# Patient Record
Sex: FEMALE | Race: AMERICAN INDIAN OR ALASKA NATIVE | NOT HISPANIC OR LATINO | Employment: FULL TIME | URBAN - METROPOLITAN AREA
[De-identification: names, ages, dates, MRNs, and addresses within clinical notes are randomized per-mention and may not be internally consistent; named-entity substitution may affect disease eponyms.]

---

## 2017-12-19 ENCOUNTER — OFFICE VISIT - RIVER FALLS (OUTPATIENT)
Dept: FAMILY MEDICINE | Facility: CLINIC | Age: 27
End: 2017-12-19

## 2017-12-19 ASSESSMENT — MIFFLIN-ST. JEOR: SCORE: 1473.81

## 2018-02-20 ENCOUNTER — OFFICE VISIT - RIVER FALLS (OUTPATIENT)
Dept: FAMILY MEDICINE | Facility: CLINIC | Age: 28
End: 2018-02-20

## 2018-02-26 ENCOUNTER — OFFICE VISIT - RIVER FALLS (OUTPATIENT)
Dept: FAMILY MEDICINE | Facility: CLINIC | Age: 28
End: 2018-02-26

## 2018-02-26 ASSESSMENT — MIFFLIN-ST. JEOR: SCORE: 1496.49

## 2018-04-16 ENCOUNTER — OFFICE VISIT - RIVER FALLS (OUTPATIENT)
Dept: FAMILY MEDICINE | Facility: CLINIC | Age: 28
End: 2018-04-16

## 2018-04-16 ASSESSMENT — MIFFLIN-ST. JEOR: SCORE: 1481.98

## 2018-08-01 ENCOUNTER — OFFICE VISIT - RIVER FALLS (OUTPATIENT)
Dept: FAMILY MEDICINE | Facility: CLINIC | Age: 28
End: 2018-08-01

## 2018-08-08 ENCOUNTER — OFFICE VISIT - RIVER FALLS (OUTPATIENT)
Dept: FAMILY MEDICINE | Facility: CLINIC | Age: 28
End: 2018-08-08

## 2018-09-11 ENCOUNTER — OFFICE VISIT - RIVER FALLS (OUTPATIENT)
Dept: FAMILY MEDICINE | Facility: CLINIC | Age: 28
End: 2018-09-11

## 2018-09-12 LAB
CREAT SERPL-MCNC: 0.71 MG/DL (ref 0.5–1.1)
GLUCOSE BLD-MCNC: 71 MG/DL (ref 65–99)

## 2018-10-09 ENCOUNTER — OFFICE VISIT - RIVER FALLS (OUTPATIENT)
Dept: FAMILY MEDICINE | Facility: CLINIC | Age: 28
End: 2018-10-09

## 2018-10-25 ENCOUNTER — OFFICE VISIT - RIVER FALLS (OUTPATIENT)
Dept: FAMILY MEDICINE | Facility: CLINIC | Age: 28
End: 2018-10-25

## 2018-10-25 ASSESSMENT — MIFFLIN-ST. JEOR: SCORE: 1414.85

## 2018-11-12 ENCOUNTER — OFFICE VISIT - RIVER FALLS (OUTPATIENT)
Dept: FAMILY MEDICINE | Facility: CLINIC | Age: 28
End: 2018-11-12

## 2018-11-27 ENCOUNTER — OFFICE VISIT - RIVER FALLS (OUTPATIENT)
Dept: FAMILY MEDICINE | Facility: CLINIC | Age: 28
End: 2018-11-27

## 2019-01-21 ENCOUNTER — OFFICE VISIT - RIVER FALLS (OUTPATIENT)
Dept: FAMILY MEDICINE | Facility: CLINIC | Age: 29
End: 2019-01-21

## 2019-01-21 ASSESSMENT — MIFFLIN-ST. JEOR: SCORE: 1483.79

## 2019-02-01 ENCOUNTER — OFFICE VISIT - RIVER FALLS (OUTPATIENT)
Dept: FAMILY MEDICINE | Facility: CLINIC | Age: 29
End: 2019-02-01

## 2019-02-13 ENCOUNTER — OFFICE VISIT - RIVER FALLS (OUTPATIENT)
Dept: FAMILY MEDICINE | Facility: CLINIC | Age: 29
End: 2019-02-13

## 2019-03-12 ENCOUNTER — OFFICE VISIT - RIVER FALLS (OUTPATIENT)
Dept: FAMILY MEDICINE | Facility: CLINIC | Age: 29
End: 2019-03-12

## 2019-03-27 ENCOUNTER — OFFICE VISIT - RIVER FALLS (OUTPATIENT)
Dept: FAMILY MEDICINE | Facility: CLINIC | Age: 29
End: 2019-03-27

## 2019-03-27 ASSESSMENT — MIFFLIN-ST. JEOR: SCORE: 1492.86

## 2019-08-20 ENCOUNTER — OFFICE VISIT - RIVER FALLS (OUTPATIENT)
Dept: FAMILY MEDICINE | Facility: CLINIC | Age: 29
End: 2019-08-20

## 2019-08-21 ENCOUNTER — COMMUNICATION - RIVER FALLS (OUTPATIENT)
Dept: FAMILY MEDICINE | Facility: CLINIC | Age: 29
End: 2019-08-21

## 2019-12-19 ENCOUNTER — OFFICE VISIT - RIVER FALLS (OUTPATIENT)
Dept: FAMILY MEDICINE | Facility: CLINIC | Age: 29
End: 2019-12-19

## 2019-12-20 LAB
BASOPHILS # BLD MANUAL: 30 10*3/UL (ref 0–200)
BASOPHILS NFR BLD MANUAL: 0.6 %
BUN SERPL-MCNC: 10 MG/DL (ref 7–25)
BUN/CREAT RATIO - HISTORICAL: ABNORMAL (ref 6–22)
CALCIUM SERPL-MCNC: 9 MG/DL (ref 8.6–10.2)
CHLORIDE BLD-SCNC: 106 MMOL/L (ref 98–110)
CO2 SERPL-SCNC: 30 MMOL/L (ref 20–32)
CREAT SERPL-MCNC: 0.83 MG/DL (ref 0.5–1.1)
EGFRCR SERPLBLD CKD-EPI 2021: 94 ML/MIN/1.73M2
EOSINOPHIL # BLD MANUAL: 130 10*3/UL (ref 15–500)
EOSINOPHIL NFR BLD MANUAL: 2.6 %
ERYTHROCYTE [DISTWIDTH] IN BLOOD BY AUTOMATED COUNT: 11.8 % (ref 11–15)
GLUCOSE BLD-MCNC: 59 MG/DL (ref 65–139)
HCT VFR BLD AUTO: 38.1 % (ref 35–45)
HGB BLD-MCNC: 13.2 GM/DL (ref 11.7–15.5)
LYMPHOCYTES # BLD MANUAL: 1855 10*3/UL (ref 850–3900)
LYMPHOCYTES NFR BLD MANUAL: 37.1 %
MCH RBC QN AUTO: 31.8 PG (ref 27–33)
MCHC RBC AUTO-ENTMCNC: 34.6 GM/DL (ref 32–36)
MCV RBC AUTO: 91.8 FL (ref 80–100)
MONOCYTES # BLD MANUAL: 415 10*3/UL (ref 200–950)
MONOCYTES NFR BLD MANUAL: 8.3 %
NEUTROPHILS # BLD MANUAL: 2570 10*3/UL (ref 1500–7800)
NEUTROPHILS NFR BLD MANUAL: 51.4 %
PLATELET # BLD AUTO: 170 10*3/UL (ref 140–400)
PMV BLD: 10.4 FL (ref 7.5–12.5)
POTASSIUM BLD-SCNC: 4.2 MMOL/L (ref 3.5–5.3)
RBC # BLD AUTO: 4.15 10*6/UL (ref 3.8–5.1)
SODIUM SERPL-SCNC: 141 MMOL/L (ref 135–146)
WBC # BLD AUTO: 5 10*3/UL (ref 3.8–10.8)

## 2019-12-23 ENCOUNTER — COMMUNICATION - RIVER FALLS (OUTPATIENT)
Dept: FAMILY MEDICINE | Facility: CLINIC | Age: 29
End: 2019-12-23

## 2020-01-17 ENCOUNTER — OFFICE VISIT - RIVER FALLS (OUTPATIENT)
Dept: FAMILY MEDICINE | Facility: CLINIC | Age: 30
End: 2020-01-17

## 2020-02-21 ENCOUNTER — OFFICE VISIT - RIVER FALLS (OUTPATIENT)
Dept: FAMILY MEDICINE | Facility: CLINIC | Age: 30
End: 2020-02-21

## 2020-02-21 ASSESSMENT — MIFFLIN-ST. JEOR: SCORE: 1555.46

## 2020-05-06 ENCOUNTER — OFFICE VISIT - RIVER FALLS (OUTPATIENT)
Dept: FAMILY MEDICINE | Facility: CLINIC | Age: 30
End: 2020-05-06

## 2020-05-14 ENCOUNTER — AMBULATORY - RIVER FALLS (OUTPATIENT)
Dept: FAMILY MEDICINE | Facility: CLINIC | Age: 30
End: 2020-05-14

## 2020-05-14 ASSESSMENT — MIFFLIN-ST. JEOR: SCORE: 1511.01

## 2020-05-15 LAB
A/G RATIO - HISTORICAL: 2.3 (ref 1–2.5)
ALBUMIN SERPL-MCNC: 4.4 GM/DL (ref 3.6–5.1)
ALP SERPL-CCNC: 55 UNIT/L (ref 31–125)
ALT SERPL W P-5'-P-CCNC: 12 UNIT/L (ref 6–29)
AST SERPL W P-5'-P-CCNC: 14 UNIT/L (ref 10–30)
BILIRUB SERPL-MCNC: 0.5 MG/DL (ref 0.2–1.2)
BUN SERPL-MCNC: 11 MG/DL (ref 7–25)
BUN/CREAT RATIO - HISTORICAL: ABNORMAL (ref 6–22)
CALCIUM SERPL-MCNC: 8.9 MG/DL (ref 8.6–10.2)
CHLORIDE BLD-SCNC: 104 MMOL/L (ref 98–110)
CO2 SERPL-SCNC: 30 MMOL/L (ref 20–32)
CREAT SERPL-MCNC: 0.81 MG/DL (ref 0.5–1.1)
EGFRCR SERPLBLD CKD-EPI 2021: 97 ML/MIN/1.73M2
GLOBULIN: 1.9 (ref 1.9–3.7)
GLUCOSE BLD-MCNC: 113 MG/DL (ref 65–99)
POTASSIUM BLD-SCNC: 3.9 MMOL/L (ref 3.5–5.3)
PROT SERPL-MCNC: 6.3 GM/DL (ref 6.1–8.1)
SODIUM SERPL-SCNC: 140 MMOL/L (ref 135–146)

## 2020-05-21 ENCOUNTER — COMMUNICATION - RIVER FALLS (OUTPATIENT)
Dept: FAMILY MEDICINE | Facility: CLINIC | Age: 30
End: 2020-05-21

## 2020-07-23 ENCOUNTER — OFFICE VISIT - RIVER FALLS (OUTPATIENT)
Dept: FAMILY MEDICINE | Facility: CLINIC | Age: 30
End: 2020-07-23

## 2020-07-28 ENCOUNTER — AMBULATORY - RIVER FALLS (OUTPATIENT)
Dept: FAMILY MEDICINE | Facility: CLINIC | Age: 30
End: 2020-07-28

## 2020-08-04 ENCOUNTER — COMMUNICATION - RIVER FALLS (OUTPATIENT)
Dept: FAMILY MEDICINE | Facility: CLINIC | Age: 30
End: 2020-08-04

## 2020-08-05 ENCOUNTER — OFFICE VISIT - RIVER FALLS (OUTPATIENT)
Dept: FAMILY MEDICINE | Facility: CLINIC | Age: 30
End: 2020-08-05

## 2020-08-11 ENCOUNTER — COMMUNICATION - RIVER FALLS (OUTPATIENT)
Dept: FAMILY MEDICINE | Facility: CLINIC | Age: 30
End: 2020-08-11

## 2021-01-05 ENCOUNTER — OFFICE VISIT - RIVER FALLS (OUTPATIENT)
Dept: FAMILY MEDICINE | Facility: CLINIC | Age: 31
End: 2021-01-05

## 2021-01-05 ASSESSMENT — MIFFLIN-ST. JEOR: SCORE: 1575.42

## 2021-03-01 ENCOUNTER — COMMUNICATION - RIVER FALLS (OUTPATIENT)
Dept: FAMILY MEDICINE | Facility: CLINIC | Age: 31
End: 2021-03-01

## 2021-04-01 ENCOUNTER — OFFICE VISIT - RIVER FALLS (OUTPATIENT)
Dept: FAMILY MEDICINE | Facility: CLINIC | Age: 31
End: 2021-04-01

## 2021-08-06 ENCOUNTER — OFFICE VISIT - RIVER FALLS (OUTPATIENT)
Dept: FAMILY MEDICINE | Facility: CLINIC | Age: 31
End: 2021-08-06

## 2022-02-11 VITALS
WEIGHT: 154.2 LBS | SYSTOLIC BLOOD PRESSURE: 102 MMHG | BODY MASS INDEX: 24.89 KG/M2 | WEIGHT: 158 LBS | DIASTOLIC BLOOD PRESSURE: 64 MMHG | TEMPERATURE: 98.1 F | OXYGEN SATURATION: 99 % | TEMPERATURE: 98 F | BODY MASS INDEX: 25.5 KG/M2 | OXYGEN SATURATION: 99 % | SYSTOLIC BLOOD PRESSURE: 90 MMHG | HEART RATE: 54 BPM | HEART RATE: 56 BPM | DIASTOLIC BLOOD PRESSURE: 52 MMHG

## 2022-02-11 VITALS
HEART RATE: 64 BPM | SYSTOLIC BLOOD PRESSURE: 90 MMHG | TEMPERATURE: 97.1 F | BODY MASS INDEX: 28.28 KG/M2 | DIASTOLIC BLOOD PRESSURE: 62 MMHG | SYSTOLIC BLOOD PRESSURE: 98 MMHG | SYSTOLIC BLOOD PRESSURE: 90 MMHG | DIASTOLIC BLOOD PRESSURE: 58 MMHG | OXYGEN SATURATION: 99 % | DIASTOLIC BLOOD PRESSURE: 58 MMHG | TEMPERATURE: 97.2 F | BODY MASS INDEX: 27.08 KG/M2 | WEIGHT: 167.8 LBS | WEIGHT: 160.6 LBS | WEIGHT: 168 LBS | DIASTOLIC BLOOD PRESSURE: 58 MMHG | HEART RATE: 63 BPM | BODY MASS INDEX: 27 KG/M2 | HEART RATE: 62 BPM | SYSTOLIC BLOOD PRESSURE: 100 MMHG | WEIGHT: 175.2 LBS | OXYGEN SATURATION: 99 % | HEART RATE: 66 BPM | BODY MASS INDEX: 25.92 KG/M2 | TEMPERATURE: 96.5 F | OXYGEN SATURATION: 97 % | HEIGHT: 66 IN | TEMPERATURE: 97.2 F

## 2022-02-12 VITALS
BODY MASS INDEX: 26.21 KG/M2 | TEMPERATURE: 97.5 F | HEART RATE: 90 BPM | WEIGHT: 162.4 LBS | SYSTOLIC BLOOD PRESSURE: 114 MMHG | DIASTOLIC BLOOD PRESSURE: 64 MMHG

## 2022-02-12 VITALS
BODY MASS INDEX: 26.93 KG/M2 | DIASTOLIC BLOOD PRESSURE: 72 MMHG | SYSTOLIC BLOOD PRESSURE: 90 MMHG | WEIGHT: 167.6 LBS | HEART RATE: 52 BPM | HEIGHT: 66 IN | TEMPERATURE: 97.2 F

## 2022-02-12 VITALS
BODY MASS INDEX: 25.7 KG/M2 | SYSTOLIC BLOOD PRESSURE: 94 MMHG | TEMPERATURE: 98.2 F | DIASTOLIC BLOOD PRESSURE: 62 MMHG | WEIGHT: 158.6 LBS | OXYGEN SATURATION: 98 % | HEART RATE: 77 BPM | DIASTOLIC BLOOD PRESSURE: 60 MMHG | OXYGEN SATURATION: 98 % | HEART RATE: 68 BPM | SYSTOLIC BLOOD PRESSURE: 88 MMHG | SYSTOLIC BLOOD PRESSURE: 98 MMHG | HEART RATE: 72 BPM | BODY MASS INDEX: 25.6 KG/M2 | BODY MASS INDEX: 25.6 KG/M2 | HEIGHT: 66 IN | OXYGEN SATURATION: 97 % | DIASTOLIC BLOOD PRESSURE: 54 MMHG | WEIGHT: 152.8 LBS | HEART RATE: 77 BPM | WEIGHT: 158.6 LBS | DIASTOLIC BLOOD PRESSURE: 54 MMHG | TEMPERATURE: 97.1 F | WEIGHT: 159.2 LBS | DIASTOLIC BLOOD PRESSURE: 50 MMHG | HEART RATE: 68 BPM | OXYGEN SATURATION: 99 % | TEMPERATURE: 98.1 F | TEMPERATURE: 98.1 F | BODY MASS INDEX: 24.56 KG/M2 | SYSTOLIC BLOOD PRESSURE: 98 MMHG | SYSTOLIC BLOOD PRESSURE: 104 MMHG | TEMPERATURE: 97 F

## 2022-02-12 VITALS
DIASTOLIC BLOOD PRESSURE: 63 MMHG | DIASTOLIC BLOOD PRESSURE: 60 MMHG | SYSTOLIC BLOOD PRESSURE: 95 MMHG | BODY MASS INDEX: 27.54 KG/M2 | HEART RATE: 51 BPM | OXYGEN SATURATION: 98 % | WEIGHT: 170 LBS | HEART RATE: 69 BPM | TEMPERATURE: 97.5 F | SYSTOLIC BLOOD PRESSURE: 90 MMHG | BODY MASS INDEX: 27.32 KG/M2 | WEIGHT: 170.6 LBS | HEIGHT: 66 IN

## 2022-02-12 VITALS
HEART RATE: 70 BPM | WEIGHT: 170.8 LBS | HEART RATE: 72 BPM | SYSTOLIC BLOOD PRESSURE: 92 MMHG | DIASTOLIC BLOOD PRESSURE: 64 MMHG | TEMPERATURE: 98.7 F | WEIGHT: 165.8 LBS | BODY MASS INDEX: 26.65 KG/M2 | HEIGHT: 66 IN | HEART RATE: 59 BPM | SYSTOLIC BLOOD PRESSURE: 96 MMHG | WEIGHT: 170 LBS | TEMPERATURE: 97.6 F | BODY MASS INDEX: 27.45 KG/M2 | BODY MASS INDEX: 27.44 KG/M2 | HEIGHT: 66 IN | DIASTOLIC BLOOD PRESSURE: 60 MMHG | SYSTOLIC BLOOD PRESSURE: 102 MMHG | DIASTOLIC BLOOD PRESSURE: 54 MMHG

## 2022-02-12 VITALS
TEMPERATURE: 97.1 F | SYSTOLIC BLOOD PRESSURE: 108 MMHG | BODY MASS INDEX: 27.97 KG/M2 | WEIGHT: 174 LBS | TEMPERATURE: 97.8 F | WEIGHT: 183.8 LBS | HEIGHT: 66 IN | DIASTOLIC BLOOD PRESSURE: 62 MMHG | BODY MASS INDEX: 29.54 KG/M2 | DIASTOLIC BLOOD PRESSURE: 72 MMHG | SYSTOLIC BLOOD PRESSURE: 95 MMHG | HEIGHT: 66 IN | HEART RATE: 56 BPM | HEART RATE: 87 BPM

## 2022-02-12 VITALS
DIASTOLIC BLOOD PRESSURE: 64 MMHG | HEIGHT: 66 IN | OXYGEN SATURATION: 99 % | BODY MASS INDEX: 30.25 KG/M2 | WEIGHT: 188.2 LBS | TEMPERATURE: 98.5 F | HEART RATE: 76 BPM | SYSTOLIC BLOOD PRESSURE: 98 MMHG

## 2022-02-12 VITALS
OXYGEN SATURATION: 99 % | DIASTOLIC BLOOD PRESSURE: 60 MMHG | HEART RATE: 95 BPM | RESPIRATION RATE: 16 BRPM | SYSTOLIC BLOOD PRESSURE: 98 MMHG | WEIGHT: 181.3 LBS | BODY MASS INDEX: 29.26 KG/M2

## 2022-02-12 VITALS — BODY MASS INDEX: 28.08 KG/M2 | BODY MASS INDEX: 28.08 KG/M2 | HEIGHT: 66 IN | HEIGHT: 66 IN

## 2022-02-12 VITALS
OXYGEN SATURATION: 99 % | BODY MASS INDEX: 26.92 KG/M2 | SYSTOLIC BLOOD PRESSURE: 116 MMHG | WEIGHT: 166.8 LBS | DIASTOLIC BLOOD PRESSURE: 60 MMHG | HEART RATE: 94 BPM | TEMPERATURE: 98.5 F

## 2022-02-15 NOTE — PROGRESS NOTES
Chief Complaint    currently being treated for sinusitis and tonsillitis. c/o having ongoing sore throat, seen in ER last Wednesday. hasn't been able to eat since Thursday night--hurts to swallow, has heartburn. has appt w/ ENT on Thursday in [place].  History of Present Illness      Here for follow up fro her ED visit for tonsillitis.  Treated with clindamycin and dexamethasone. Still has right sided throat pain.  Feels that the medications are upsetting her stomach.  Has not eaten much since last week.  No fever, chills or sweats.  Review of Systems          ROS reviewed an negative except for symptoms noted in HPI.            Physical Exam   Vitals & Measurements    T: 97.6(Tympanic)  HR: 72(Peripheral)  BP: 92/54     HT: 66 in  WT: 170.8 lb  BMI: 27.56           General:  Alert and oriented, No acute distress.            HENT:  Normocephalic, Tympanic membranes are clear, Normal hearing, Oral mucosa is moist.                 Throat: Tonsils, right enlarged with exudate, Pharynx ( Not edematous, Erythematous, No exudate ).            Neck:  anterior cervical adenopathy.            Respiratory:  Lungs are clear to auscultation, Respirations are non-labored.            Cardiovascular:  Normal rate, Regular rhythm.            Integumentary:  Warm, Dry, No rash.  Assessment/Plan       Sore throat         Continued pain most likely due to the tonsillitis.  No evidence of peritonsillar abscess or cellulitis.  Concern for parapharyngeal abscess given persistence of her pain and minimal improvement with antibiotics.  CT ordered for further evaluation.  She will continue with antibiotics.         Ordered:          CT Neck Soft Tissue w/ Contrast (Request), Priority: Urgent, Instructions: IV CONTRAST, Sore throat           Patient Information     Name:MATHEUS REARDON      Address:      [address] [place] [id number]     Sex:Female     YOB: 1990     [phone]      MRN:[id number]     FIN:[id number]     Location:[facility], [place]     Date of Service:02/26/2018      Primary Care Physician:       Litzy ANGEL [facility], [phone]  Problem List/Past Medical History    Ongoing     Bipolar    Historical     Kidney stone     Pregnancy     Pregnancy  Procedure/Surgical History     Tubal sterilization (12/02/2011)     NVD (Normal Vaginal Delivery) (08/25/2011)     Extraction of wisdom tooth  Medications     magic mouthwash (maalox/benadryl/lidocaine. 1:1:1): See Instructions, 5-10 ml swish and spit before meals and qhs, 120 mL, 1 Refill(s).     Augmentin 875 mg oral tablet: 1 tab(s), PO, q12hr, for 10 day(s), with food or milk, 20 tab(s), 0 Refill(s).     ibuprofen 800 mg oral tablet: 800 mg, 1 tab(s), po, prn, 0 Refill(s).     predniSONE 20 mg oral tablet: 40 mg, 2 tab(s), PO, Daily, for 5 day(s), 10 tab(s), 0 Refill(s).      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 Activity level: Moderate physical work.     Exercise and Physical Activity - Occasional exercise, 02/26/2018     Home and Environment - 02/26/2018      Marital status: Single. Spouse/Partner name: shares custody with ex-partner. Lives with Children, Significant other. 3 children. Living situation: Home/Independent.     Nutrition and Health - 02/26/2018      Type of diet: Regular.     Sexual - 02/26/2018      Sexually active: Yes. Sexual orientation: Heterosexual.     Substance Abuse - Denies Substance Abuse, 02/26/2018     Tobacco - 02/26/2018      Former smoker, quit more than 30 days ago  Family History    Diabetes mellitus: Grandmother (M).    Heart disease: Grandmother (M).  Immunizations      Vaccine Date Status Comments      tetanus/diphth/pertuss  (Tdap) adult/adol 08/26/2011 Recorded [facility]  Lab Results   Results (Last 90 days)             Laboratory                  Chemistry                       General Chemistry                            U HCG POC                                     (12/19/17 12:04 PM CST)                                                                                                                                             NEGATIVE   (12/19/17 12:04 PM CST)                                                                                                                             Immunology/Serology                       Chlamydia/N. gonorrhea Testing                            Chlam/N. gonorrhea Comments:      See comment   (12/19/17 12:05 PM CST)                                                                                                                             Microbiology                       Bacteriology                            Chlamydia RNA:      NOT DETECTED   (12/19/17 12:05 PM CST)                                                                                                                                       Chlamydia trach RNA TMA:         (12/19/17 12:05 PM CST)                                                                                                                                       N. gonorrhea RNA:      NOT DETECTED   (12/19/17 12:05 PM CST)                                                                                                                                  Mycology                            Wet Prep:         (12/19/17 12:03 PM CST)                                                                                                                                       Wet Prep Clue Cells:      None Seen   (12/19/17 12:03 PM CST)                                                                                                                                       Wet Prep Trichomonas:       None Seen   (12/19/17 12:03 PM CST)                                                                                                                                       Wet Prep Yeast:      Present   (12/19/17 12:03 PM CST)

## 2022-02-15 NOTE — LETTER
(Inserted Image. Unable to display)       April 01, 2021        MATHEUS REARDON  [address] [place] [id number]      Dear MATHEUS,      Thank you for selecting [facility] for your healthcare needs.      This is a letter for you to provide to your landlord.      To whom it may concern,     Matheus Reardon is my patient. I am aware of her medical history and functional restrictions brought by her mental condition. She meets the definition of disabled under the Americans with Disabilities Act, the Rehabilitation Act of 1973, and the Fair Housing Act.     As a result of mental illness,  my patient has certain limitations related to anxiety, and depression. In order to assist in alleviating these difficulties, and to improve their ability to lead a better life while fully enjoying and using the dwelling unit you own and/or manage, I am providing this [employer] prescription letter that will help  in dealing with her disability better.               Please contact my practice at [phone] if you have any questions or concerns.     Sincerely,        Rhianna Bryant MD

## 2022-02-15 NOTE — NURSING NOTE
CAGE Assessment Entered On:  4/1/2021 12:51 PM CDT    Performed On:  4/1/2021 12:51 PM CDT by Helen Jacobs               Assessment   Have you ever felt you should cut down on your drinking :   No   Have people annoyed you by criticizing your drinking :   No   Have you ever felt bad or guilty about your drinking :   No   Have you ever taken a drink first thing in the morning to steady your nerves or get rid of a hangover (Eye-opener) :   No   CAGE Score :   0    Helen Jacobs - 4/1/2021 12:51 PM CDT

## 2022-02-15 NOTE — NURSING NOTE
Depression Screening Entered On:  12/19/2019 3:15 PM CST    Performed On:  12/19/2019 3:14 PM CST by Ishan PAIGE, Serina               Depression Screening   Little Interest - Pleasure in Activities :   Not at all   Feeling Down, Depressed, Hopeless :   Not at all   Initial Depression Screen Score :   0    Trouble Falling or Staying Asleep :   Not at all   Feeling Tired or Little Energy :   Not at all   Poor Appetite or Overeating :   Not at all   Feeling Bad About Yourself :   Not at all   Trouble Concentrating :   Not at all   Moving or Speaking Slowly :   Not at all   Thoughts Better Off Dead or Hurting Self :   Not at all   Detailed Depression Screen Score :   0    Total Depression Screen Score :   0    MARY Difficulty with Work, Home, Others :   Somewhat difficult   Ishan PAIGE, Serina - 12/19/2019 3:14 PM CST

## 2022-02-15 NOTE — LETTER
(Inserted Image. Unable to display)       December 23, 2019      MATHEUS REARDON  [address]  [place], [place] [id number]        Dear MATHEUS,     Thank you for selecting [facility] for your healthcare needs. Below you will find the results of your recent test(s) done at our clinic.      This is a copy for you.      Result Name Current Result Previous Result Reference Range   Sodium Level (mmol/L)  141 12/19/2019  141 9/11/2018 135 - 146   Potassium Level (mmol/L)  4.2 12/19/2019  3.8 9/11/2018 3.5 - 5.3   Chloride Level (mmol/L)  106 12/19/2019  108 9/11/2018 98 - 110   CO2 Level (mmol/L)  30 12/19/2019  27 9/11/2018 20 - 32   Glucose Level (mg/dL) ((L)) 59 12/19/2019  71 9/11/2018 65 - 139   BUN (mg/dL)  10 12/19/2019  12 9/11/2018 7 - 25   Creatinine Level (mg/dL)  0.83 12/19/2019  0.71 9/11/2018 0.50 - 1.10   BUN/Creat Ratio  NOT APPLICABLE 12/19/2019  NOT APPLICABLE 9/11/2018 6 - 22   eGFR (mL/min/1.73m2)  94 12/19/2019  114 9/11/2018 > OR = 60 -    eGFR  (mL/min/1.73m2)  109 12/19/2019  132 9/11/2018 > OR = 60 -    Calcium Level (mg/dL)  9.0 12/19/2019  8.7 9/11/2018 8.6 - 10.2   WBC  5.0 12/19/2019  3.8 - 10.8   RBC  4.15 12/19/2019  3.80 - 5.10   Hgb (gm/dL)  13.2 12/19/2019  11.7 - 15.5   Hct (%)  38.1 12/19/2019  35.0 - 45.0   MCV (fL)  91.8 12/19/2019  80.0 - 100.0   MCH (pg)  31.8 12/19/2019  27.0 - 33.0   MCHC (gm/dL)  34.6 12/19/2019  32.0 - 36.0   RDW (%)  11.8 12/19/2019  11.0 - 15.0   Platelet  170 12/19/2019  140 - 400   MPV (fL)  10.4 12/19/2019  7.5 - 12.5   Lymphocytes (%)  37.1 12/19/2019     Abs Lymphocytes  1,855 12/19/2019  850 - 3,900   Neutrophils (%)  51.4 12/19/2019     Abs Neutrophils  2,570 12/19/2019  1,500 - 7,800   Monocytes (%)  8.3 12/19/2019     Abs Monocytes  415 12/19/2019  200 - 950   Eosinophils (%)  2.6 12/19/2019     Abs Eosinophils  130 12/19/2019  15 - 500   Basophils (%)  0.6 12/19/2019     Abs Basophils  30 12/19/2019  0 - 200      Please contact my practice at [phone] if you have any questions or concerns.     Sincerely,        Rhianna Bryant MD      What do your labs mean?  Below is a glossary of commonly ordered labs:  LDL   Bad Cholesterol   HDL   Good Cholesterol  AST/ALT   Liver Function   Cr/Creatinine   Kidney Function  Microalbumin   Kidney Function  BUN   Kidney Function  PSA   Prostate    TSH   Thyroid Hormone  HgbA1c   Diabetes Test   Hgb (Hemoglobin)   Red Blood Cells

## 2022-02-15 NOTE — NURSING NOTE
Comprehensive Intake Entered On:  2/1/2019 12:38 PM CST    Performed On:  2/1/2019 12:35 PM CST by Rose Terry CMA               Summary   Chief Complaint :   f/u right shoulder pain- work comp. Feels like pain is getting worse. going down arm, crying at work.    Menstrual Status :   Menarcheal   Weight Measured :   175.2 lb(Converted to: 175 lb 3 oz, 79.47 kg)    Systolic Blood Pressure :   98 mmHg   Diastolic Blood Pressure :   62 mmHg   Mean Arterial Pressure :   74 mmHg   Peripheral Pulse Rate :   64 bpm   BP Site :   Right arm   BP Method :   Manual   HR Method :   Electronic   Temperature Tympanic :   97.1 DegF(Converted to: 36.2 DegC)  (LOW)    Oxygen Saturation :   97 %   Rose Terry CMA - 2/1/2019 12:35 PM CST   Health Status   Allergies Verified? :   Yes   Medication History Verified? :   Yes   Immunizations Current :   Yes   Pre-Visit Planning Status :   Completed   Tobacco Use? :   Former smoker   Rose Terry CMA - 2/1/2019 12:35 PM CST   Consents   Consent for Immunization Exchange :   Consent Granted   Consent for Immunizations to Providers :   Consent Granted   Rose Terry CMA - 2/1/2019 12:35 PM CST   Meds / Allergies   (As Of: 2/1/2019 12:38:59 PM CST)   Allergies (Active)   Biaxin  Estimated Onset Date:   Unspecified ; Created By:   Yadi Osborn LPN; Reaction Status:   Active ; Substance:   Biaxin ; Updated By:   Yadi Osborn LPN; Reviewed Date:   10/9/2018 9:57 AM CDT      Keflex  Estimated Onset Date:   Unspecified ; Created By:   Ydai Osborn LPN; Reaction Status:   Active ; Category:   Drug ; Substance:   Keflex ; Type:   Allergy ; Updated By:   Yadi Osborn LPN; Reviewed Date:   10/9/2018 9:57 AM CDT        Medication List   (As Of: 2/1/2019 12:38:59 PM CST)   Prescription/Discharge Order    acetaminophen-oxycodone  :   acetaminophen-oxycodone ; Status:   Prescribed ; Ordered As Mnemonic:   Percocet 5/325 oral tablet ; Simple Display Line:   1-2 tab(s), po, q6 hrs, PRN: as  needed for pain, 24 tab(s), 0 Refill(s) ; Ordering Provider:   Rhianna Bryant MD; Catalog Code:   acetaminophen-oxycodone ; Order Dt/Tm:   1/21/2019 10:29:40 AM          divalproex sodium  :   divalproex sodium ; Status:   Prescribed ; Ordered As Mnemonic:   Depakote  mg oral tablet, extended release ; Simple Display Line:   500 mg, 1 tab(s), PO, Daily, 90 tab(s), 1 Refill(s) ; Ordering Provider:   Rhianna Bryant MD; Catalog Code:   divalproex sodium ; Order Dt/Tm:   8/8/2018 9:29:22 AM          DULoxetine  :   DULoxetine ; Status:   Prescribed ; Ordered As Mnemonic:   DULoxetine 60 mg oral delayed release capsule ; Simple Display Line:   60 mg, 1 cap(s), Oral, daily, 90 cap(s), 1 Refill(s) ; Ordering Provider:   Rhianna Bryant MD; Catalog Code:   DULoxetine ; Order Dt/Tm:   9/11/2018 2:58:27 PM          lidocaine topical  :   lidocaine topical ; Status:   Prescribed ; Ordered As Mnemonic:   lidocaine 5% topical ointment ; Simple Display Line:   1 jac, TOP, TID, for 7 day(s), PRN: for itching, 35 gm, 1 Refill(s) ; Ordering Provider:   Rhianna Bryant MD; Catalog Code:   lidocaine topical ; Order Dt/Tm:   8/8/2018 9:17:58 AM          meloxicam  :   meloxicam ; Status:   Prescribed ; Ordered As Mnemonic:   meloxicam 15 mg oral tablet ; Simple Display Line:   15 mg, 1 tab(s), PO, Daily, PRN: for pain, 30 tab(s), 3 Refill(s) ; Ordering Provider:   Rhianna Bryant MD; Catalog Code:   meloxicam ; Order Dt/Tm:   9/11/2018 2:51:01 PM          triamcinolone topical  :   triamcinolone topical ; Status:   Prescribed ; Ordered As Mnemonic:   triamcinolone 0.1% topical ointment ; Simple Display Line:   1 jac, TOP, TID, for 7 day(s), PRN: for rash, 30 gm, 1 Refill(s) ; Ordering Provider:   Rhianna Bryant MD; Catalog Code:   triamcinolone topical ; Order Dt/Tm:   8/8/2018 9:17:45 AM

## 2022-02-15 NOTE — PROGRESS NOTES
Patient Information     Name:MATHEUS REARDON      Address:      [address] [place] [address] [address]      [place], [place] [id number]     Sex:Female     YOB: 1990     [phone]     Emergency Contact:STAR DRAKE     MRN:[id number]     FIN:[id number]     Location:[facility]     Date of Service:05/06/2020      Primary Care Physician:       Rhianna Bryant MD, [phone]      Attending Physician:       Rhianna Bryant MD, [phone]   [id number]  Subjective      Today's visit was conducted via telephone due to the COVID-19 pandemic.       Patient consent, as follows, for telephone visit was obtained.   You have been scheduled for a telephone visit, which is a billable service.  This is a replacement for a face-to-face visit that is being recommended at this time to help keep our patient safe.  If, during your phone visit , we decide that you need a face-to-face visit, your phone visit will be canceled and you will be rescheduled to come into the clinic for a face to face appointment.  Are you agreeable with proceeding with a telephone visit?       HPI      hx of bipolar disorder, depakote has been workign well to control anxiety but feels depression has gotten worse.  tolerates duloxetine 60 mg 1 po qday, no SI      ROS 10 point ROS is neg except as per HPI      Discussed:      Contact clinic if sx worsen or do not improve.       Also discussed methods to reduce risks of Covid-19 including social isolation and avoid touching face and frequent, adequate hand washing.  If you develop upper respiratory symptoms then call the clinic or the ED to discuss whether or not you should come in for further evaluation and treatment.  Assessment/Plan       Bipolar (F31.32)         Ordered:          DULoxetine, = 1 cap(s) ( 60 mg ), Oral, bid, # 180 cap(s), 1 Refill(s), Type: Maintenance, Pharmacy: [id number], 1 cap(s) Oral bid, (Ordered)                Medication management  (Z79.899)         Ordered:          Return to Clinic (Request), RFV: lab only CMP, Return in 1 week                Orders:         Return to Clinic (Request), RFV: f/u mood 2 weeks, Return in 2 weeks      will cont depakote, increase duloxetine to 1 po bid, recheck liver enzymes in 1 week and f/u in 2 weeks, sooner if sx worsen

## 2022-02-15 NOTE — LETTER
(Inserted Image. Unable to display)       May 21, 2020      MATHEUS REARDON  [address] [place] [phone]        Dear MATHEUS,     Thank you for selecting [facility] for your healthcare needs. Below you will find the results of your recent test(s) done at our clinic.      These look good.        Result Name Current Result Previous Result Reference Range   Sodium Level (mmol/L)  140 5/14/2020  141 12/19/2019 135 - 146   Potassium Level (mmol/L)  3.9 5/14/2020  4.2 12/19/2019 3.5 - 5.3   Chloride Level (mmol/L)  104 5/14/2020  106 12/19/2019 98 - 110   CO2 Level (mmol/L)  30 5/14/2020  30 12/19/2019 20 - 32   Glucose Level (mg/dL) ((H)) 113 5/14/2020 ((L)) 59 12/19/2019 65 - 99   BUN (mg/dL)  11 5/14/2020  10 12/19/2019 7 - 25   Creatinine Level (mg/dL)  0.81 5/14/2020  0.83 12/19/2019 0.50 - 1.10   BUN/Creat Ratio  NOT APPLICABLE 5/14/2020  NOT APPLICABLE 12/19/2019 6 - 22   eGFR (mL/min/1.73m2)  97 5/14/2020  94 12/19/2019 > OR = 60 -    eGFR  (mL/min/1.73m2)  112 5/14/2020  109 12/19/2019 > OR = 60 -    Calcium Level (mg/dL)  8.9 5/14/2020  9.0 12/19/2019 8.6 - 10.2   Bilirubin Total (mg/dL)  0.5 5/14/2020  0.2 - 1.2   Alkaline Phosphatase (unit/L)  55 5/14/2020  31 - 125   AST/SGOT (unit/L)  14 5/14/2020  10 - 30   ALT/SGPT (unit/L)  12 5/14/2020  6 - 29   Protein Total (gm/dL)  6.3 5/14/2020  6.1 - 8.1   Albumin Level (gm/dL)  4.4 5/14/2020  3.6 - 5.1   Globulin  1.9 5/14/2020  1.9 - 3.7   A/G Ratio  2.3 5/14/2020  1.0 - 2.5     Please contact my practice at [phone] if you have any questions or concerns.     Sincerely,        Rhianna Bryant MD      What do your labs mean?  Below is a glossary of commonly ordered labs:  LDL   Bad Cholesterol   HDL   Good Cholesterol  AST/ALT   Liver Function   Cr/Creatinine   Kidney Function  Microalbumin   Kidney Function  BUN   Kidney Function  PSA   Prostate    TSH   Thyroid Hormone  HgbA1c   Diabetes Test   Hgb  (Hemoglobin)   Red Blood Cells

## 2022-02-15 NOTE — TELEPHONE ENCOUNTER
---------------------  From: Rose Terry CMA   Sent: 9/3/2019 12:24:09 PM CDT  Subject: [facility] forms     LVMFCB at 1222. Please ask patient to RTC to fill out forms we received from [facility] regarding Psychiatric Conditions.     This should be a 40min appt with [facility] LM in regards to this ppwk. I called back and let them know we are waiting on pt to schedule an appt.Jagruti with [employer] [facility] [employer] called 9:07am 09/11/19, she is faxing over a records request for this patient so she will not need to schedule an appt with [facility]. They will just need records faxed. Once VJ is received I will forward to our copy service for them to send Medical Records. thanksNever Received Request for Records from [employer]. Thanks

## 2022-02-15 NOTE — NURSING NOTE
Comprehensive Intake Entered On:  2/13/2019 9:49 AM CST    Performed On:  2/13/2019 9:47 AM CST by Rose Terry CMA               Summary   Chief Complaint :   Pre-op DOS 2/25/19 Stephenie Wright.    Menstrual Status :   Menarcheal   Weight Measured :   167.8 lb(Converted to: 167 lb 13 oz, 76.11 kg)    Systolic Blood Pressure :   90 mmHg   Diastolic Blood Pressure :   58 mmHg (LOW)    Mean Arterial Pressure :   69 mmHg   Peripheral Pulse Rate :   63 bpm   BP Site :   Right arm   BP Method :   Manual   HR Method :   Electronic   Temperature Tympanic :   97.2 DegF(Converted to: 36.2 DegC)  (LOW)    Oxygen Saturation :   99 %   Rose Terry CMA - 2/13/2019 9:47 AM CST   Health Status   Allergies Verified? :   Yes   Medication History Verified? :   Yes   Immunizations Current :   Yes   Pre-Visit Planning Status :   Completed   Tobacco Use? :   Never smoker   Rose Terry CMA - 2/13/2019 9:47 AM CST   Consents   Consent for Immunization Exchange :   Consent Granted   Consent for Immunizations to Providers :   Consent Granted   Rose Terry CMA - 2/13/2019 9:47 AM CST   Meds / Allergies   (As Of: 2/13/2019 9:49:27 AM CST)   Allergies (Active)   Biaxin  Estimated Onset Date:   Unspecified ; Created By:   Yadi Osborn LPN; Reaction Status:   Active ; Substance:   Biaxin ; Updated By:   Yadi Osborn LPN; Reviewed Date:   10/9/2018 9:57 AM CDT      Keflex  Estimated Onset Date:   Unspecified ; Created By:   Yadi Osborn LPN; Reaction Status:   Active ; Category:   Drug ; Substance:   Keflex ; Type:   Allergy ; Updated By:   Yadi Osborn LPN; Reviewed Date:   10/9/2018 9:57 AM CDT        Medication List   (As Of: 2/13/2019 9:49:27 AM CST)   Prescription/Discharge Order    acetaminophen-oxycodone  :   acetaminophen-oxycodone ; Status:   Prescribed ; Ordered As Mnemonic:   Percocet 10/325 oral tablet ; Simple Display Line:   1 tab(s), po, bid, PRN: pain, 50 tab(s), 0 Refill(s) ; Ordering Provider:    Rhianna Bryant MD; Catalog Code:   acetaminophen-oxycodone ; Order Dt/Tm:   2/1/2019 1:17:36 PM          acetaminophen-oxycodone  :   acetaminophen-oxycodone ; Status:   Prescribed ; Ordered As Mnemonic:   Percocet 5/325 oral tablet ; Simple Display Line:   1-2 tab(s), po, q6 hrs, PRN: as needed for pain, 24 tab(s), 0 Refill(s) ; Ordering Provider:   Rhianna Bryant MD; Catalog Code:   acetaminophen-oxycodone ; Order Dt/Tm:   1/21/2019 10:29:40 AM          diclofenac topical  :   diclofenac topical ; Status:   Prescribed ; Ordered As Mnemonic:   Voltaren 1% topical gel ; Simple Display Line:   2 gm, Topical, qid, not to exceed 8 grams/day/single joint of upper extremities, PRN: for pain, 100 gm, 1 Refill(s) ; Ordering Provider:   Rhianna Bryant MD; Catalog Code:   diclofenac topical ; Order Dt/Tm:   2/1/2019 1:19:14 PM          divalproex sodium  :   divalproex sodium ; Status:   Prescribed ; Ordered As Mnemonic:   Depakote  mg oral tablet, extended release ; Simple Display Line:   500 mg, 1 tab(s), PO, Daily, 90 tab(s), 1 Refill(s) ; Ordering Provider:   Rhianna Bryant MD; Catalog Code:   divalproex sodium ; Order Dt/Tm:   8/8/2018 9:29:22 AM          DULoxetine  :   DULoxetine ; Status:   Prescribed ; Ordered As Mnemonic:   DULoxetine 60 mg oral delayed release capsule ; Simple Display Line:   60 mg, 1 cap(s), Oral, daily, 90 cap(s), 1 Refill(s) ; Ordering Provider:   Rhianna Bryant MD; Catalog Code:   DULoxetine ; Order Dt/Tm:   9/11/2018 2:58:27 PM          lidocaine topical  :   lidocaine topical ; Status:   Prescribed ; Ordered As Mnemonic:   lidocaine 5% topical ointment ; Simple Display Line:   1 jac, TOP, TID, for 7 day(s), PRN: for itching, 35 gm, 1 Refill(s) ; Ordering Provider:   Rhianna Bryant MD; Catalog Code:   lidocaine topical ; Order Dt/Tm:   8/8/2018 9:17:58 AM          meloxicam  :   meloxicam ; Status:   Prescribed ; Ordered As Mnemonic:   meloxicam 15 mg oral tablet ;  Simple Display Line:   15 mg, 1 tab(s), PO, Daily, PRN: for pain, 30 tab(s), 3 Refill(s) ; Ordering Provider:   Rhianna Bryant MD; Catalog Code:   meloxicam ; Order Dt/Tm:   9/11/2018 2:51:01 PM          triamcinolone topical  :   triamcinolone topical ; Status:   Prescribed ; Ordered As Mnemonic:   triamcinolone 0.1% topical ointment ; Simple Display Line:   1 jac, TOP, TID, for 7 day(s), PRN: for rash, 30 gm, 1 Refill(s) ; Ordering Provider:   Rhianna Bryant MD; Catalog Code:   triamcinolone topical ; Order Dt/Tm:   8/8/2018 9:17:45 AM

## 2022-02-15 NOTE — NURSING NOTE
Comprehensive Intake Entered On:  2/21/2020 10:41 AM CST    Performed On:  2/21/2020 10:38 AM CST by Melani Horne               Summary   Chief Complaint :   Reqesting refill of pain meds.  Right shoulder Surgery on 1/6/20.    Menstrual Status :   Menarcheal   Weight Measured :   183.8 lb(Converted to: 183 lb 13 oz, 83.37 kg)    Height Measured :   66 in(Converted to: 5 ft 6 in, 167.64 cm)    Body Mass Index :   29.66 kg/m2 (HI)    Body Surface Area :   1.97 m2   Systolic Blood Pressure :   95 mmHg   Diastolic Blood Pressure :   62 mmHg   Mean Arterial Pressure :   73 mmHg   Peripheral Pulse Rate :   56 bpm (LOW)    BP Method :   Electronic   HR Method :   Electronic   Temperature Tympanic :   97.1 DegF(Converted to: 36.2 DegC)  (LOW)    Melani Horne - 2/21/2020 10:38 AM CST   Health Status   Allergies Verified? :   Yes   Medication History Verified? :   Yes   Immunizations Current :   Yes   Tobacco Use? :   Former smoker   Melani Horne - 2/21/2020 10:38 AM CST   Meds / Allergies   (As Of: 2/21/2020 10:41:12 AM CST)   Allergies (Active)   Biaxin  Estimated Onset Date:   Unspecified ; Created By:   Yadi Osborn LPN; Reaction Status:   Active ; Substance:   Biaxin ; Updated By:   Yadi Osborn LPN; Reviewed Date:   3/27/2019 12:30 PM CDT      Keflex  Estimated Onset Date:   Unspecified ; Created By:   Yadi Osborn LPN; Reaction Status:   Active ; Category:   Drug ; Substance:   Keflex ; Type:   Allergy ; Updated By:   Yadi Osborn LPN; Reviewed Date:   3/27/2019 12:30 PM CDT        Medication List   (As Of: 2/21/2020 10:41:12 AM CST)   Prescription/Discharge Order    DULoxetine  :   DULoxetine ; Status:   Prescribed ; Ordered As Mnemonic:   DULoxetine 60 mg oral delayed release capsule ; Simple Display Line:   1 cap(s), Oral, daily, 90 cap(s), 1 Refill(s) ; Ordering Provider:   Rhianna Bryant MD; Catalog Code:   DULoxetine ; Order Dt/Tm:   12/20/2019 4:32:28 PM CST          divalproex  sodium  :   divalproex sodium ; Status:   Prescribed ; Ordered As Mnemonic:   divalproex sodium 250 mg oral tablet, extended release ; Simple Display Line:   3 tab(s), Oral, daily, 270 tab(s), 1 Refill(s) ; Ordering Provider:   Rhianna Bryant MD; Catalog Code:   divalproex sodium ; Order Dt/Tm:   12/20/2019 4:31:56 PM CST          acetaminophen-oxycodone  :   acetaminophen-oxycodone ; Status:   Prescribed ; Ordered As Mnemonic:   Percocet 5/325 oral tablet ; Simple Display Line:   1-2 tab(s), po, q6 hrs, PRN: as needed for pain, 20 tab(s), 0 Refill(s) ; Ordering Provider:   Rhianna Bryant MD; Catalog Code:   acetaminophen-oxycodone ; Order Dt/Tm:   12/19/2019 10:35:21 AM CST

## 2022-02-15 NOTE — NURSING NOTE
Generalized Anxiety Disorder Screening Entered On:  4/1/2021 12:52 PM CDT    Performed On:  4/1/2021 12:51 PM CDT by Helen Jacobs               Generalized Anxiety Disorder Screening   MARY Nervous, Anxious On Edge :   Several days   MARY Control Worrying B :   Not at all   MARY Worrying Too Much :   Not at all   MARY Trouble Relaxing :   Several days   MARY Restless :   More than half the days   MARY Easily Annoyed/Irritable :   Several days   MARY Afraid :   Not at all   MARY Total Screening Score :   5    MARY Difficulty with Work, Home, Others :   Somewhat difficult   Helen Jacobs - 4/1/2021 12:51 PM CDT

## 2022-02-15 NOTE — NURSING NOTE
Comprehensive Intake Entered On:  1/5/2021 3:53 PM CST    Performed On:  1/5/2021 3:46 PM CST by Helen Jacobs               Summary   Chief Complaint :   Discuss medications, she is currently not taking any and she feels she should be   Last Menstrual Period :   12/21/2020 CST   Menstrual Status :   Menarcheal   Weight Measured :   188.2 lb(Converted to: 188 lb 3 oz, 85.366 kg)    Height Measured :   66 in(Converted to: 5 ft 6 in, 167.64 cm)    Body Mass Index :   30.37 kg/m2 (HI)    Body Surface Area :   1.99 m2   Height/Length Estimated :   66 in(Converted to: 5 ft 6 in, 167.64 cm)    Systolic Blood Pressure :   98 mmHg   Diastolic Blood Pressure :   64 mmHg   Mean Arterial Pressure :   75 mmHg   Peripheral Pulse Rate :   76 bpm   BP Site :   Right arm   BP Method :   Manual   Temperature Tympanic :   98.5 DegF(Converted to: 36.9 DegC)    Oxygen Saturation :   99 %   Helen Jacobs - 1/5/2021 3:46 PM CST   Health Status   Allergies Verified? :   Yes   Medication History Verified? :   Yes   Immunizations Current :   Yes   Tobacco Use? :   Current every day smoker   Tobacco Cessation Review :   Ready to quit   Helen Jacobs - 1/5/2021 3:46 PM CST   Consents   Consent for Immunization Exchange :   Consent Granted   Consent for Immunizations to Providers :   Consent Granted   Helen Jacobs - 1/5/2021 3:46 PM CST   Meds / Allergies   (As Of: 1/5/2021 3:53:22 PM CST)   Allergies (Active)   Biaxin  Estimated Onset Date:   Unspecified ; Created By:   Yadi Osborn LPN; Reaction Status:   Active ; Substance:   Biaxin ; Updated By:   Yadi Osborn LPN; Reviewed Date:   8/5/2020 11:31 AM CDT      Keflex  Estimated Onset Date:   Unspecified ; Created By:   Yadi Osborn LPN; Reaction Status:   Active ; Category:   Drug ; Substance:   Keflex ; Type:   Allergy ; Updated By:   Yadi Osborn LPN; Reviewed Date:   8/5/2020 11:31 AM CDT        Medication List   (As Of: 1/5/2021 3:53:22 PM CST)    Prescription/Discharge Order    divalproex sodium  :   divalproex sodium ; Status:   Prescribed ; Ordered As Mnemonic:   divalproex sodium 500 mg oral tablet, extended release ; Simple Display Line:   1 tab(s), Oral, daily, *NEED APPT FOR FURTHER REFILLS*, 14 tab(s), 0 Refill(s) ; Ordering Provider:   Rhianna Bryant MD; Catalog Code:   divalproex sodium ; Order Dt/Tm:   10/6/2020 10:10:36 AM CDT          amoxicillin-clavulanate  :   amoxicillin-clavulanate ; Status:   Prescribed ; Ordered As Mnemonic:   Augmentin 875 mg-125 mg oral tablet ; Simple Display Line:   1 tab(s), po, bidac, for 10 day(s), 20 tab(s), 0 Refill(s) ; Ordering Provider:   Claus Alonzo MD; Catalog Code:   amoxicillin-clavulanate ; Order Dt/Tm:   8/5/2020 11:48:56 AM CDT          DULoxetine  :   DULoxetine ; Status:   Prescribed ; Ordered As Mnemonic:   DULoxetine 60 mg oral delayed release capsule ; Simple Display Line:   60 mg, 1 cap(s), Oral, bid, 180 cap(s), 1 Refill(s) ; Ordering Provider:   Rhianna Bryant MD; Catalog Code:   DULoxetine ; Order Dt/Tm:   5/6/2020 4:15:13 PM CDT            ID Risk Screen   Recent Travel History :   No recent travel   Family Member Travel History :   No recent travel   Other Exposure to Infectious Disease :   Unknown   Helen Jacobs - 1/5/2021 3:46 PM CST   Social History   Social History   (As Of: 1/5/2021 3:53:22 PM CST)   Alcohol:        Never   (Last Updated: 3/13/2019 10:07:09 AM CDT by Melanie Mccormick)          Tobacco:        5-9 cigarettes (between 1/4 to 1/2 pack)/day in last 30 days, Cigarettes   (Last Updated: 1/5/2021 3:52:55 PM CST by Helen Jacobs)          Electronic Cigarette/Vaping:        Electronic Cigarette Use: Never.   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  Activity level: Moderate physical work.    (Last Updated: 12/19/2017 12:00:19 PM CST by Margie Kumar)          Home/Environment:        Marital status: Single.  Spouse/Partner name: shares custody with ex-partner.  Lives with Children, Significant other.  3 children.  Living situation: Home/Independent.   (Last Updated: 12/19/2017 12:02:47 PM CST by Margie Kumar)          Nutrition/Health:        Type of diet: Regular.   (Last Updated: 5/27/2015 4:00:50 PM CDT by Yadi Osborn LPN)          Sexual:        Sexually active: Yes.  Sexual orientation: Heterosexual.   (Last Updated: 5/27/2015 4:01:09 PM CDT by Yadi Osborn LPN)

## 2022-02-15 NOTE — TELEPHONE ENCOUNTER
---------------------  From: Enriqueta Salcedo RN (Phone Messages Pool ([id number]_[facility]))   To: SHRAVAN Message Pool ([id number]_[place] [facility] [place]);     Sent: 8/21/2019 4:03:08 PM CDT  Subject: General Message     Phone Message    PCP:   SHRAVAN      Time of Call:  1533       Person Calling:  Jagruti [facility]  Phone number:  [phone] - ex:[phone]    Returned call at: _    Note:   Jagruti called stating a questionnaire form had been faxed on 8-19-19 regarding patient's neurological conditions and wondering if MOISES received it. If there are questions or concerns, she can be reached at the number and extension above. Reference #: [id number]    Last office visit and reason:  _Patient came in to fill out paper work. Need [facility] fax number.LDVM at [phone]. Please get fax number for [facility].8/22/19 [phone].  Received VM from Jagruti.   Fax# [phone] sent, confirmation received.

## 2022-02-15 NOTE — PROGRESS NOTES
Subjective      continued right shoulder pain now more poorly controlled, using 2 5 mg percocet qhs to sleep, hurts to reach to lift files at work, has not been using her meloxicam, has surgery coming up on the 25th   Review of Systems       neg except as per hpi  Objective   Vitals & Measurements    T: 97.1   F (Tympanic)  HR: 64(Peripheral)  BP: 98/62  SpO2: 97%  WT: 175.2 lb    Physical Exam       Review of systems is negative except as per HPI including:  no fevers, chills, sore throat, runny nose, nausea, vomiting, constipation, diarrhea, rash or new skin lesions, chest pain, palpitations, slurred speech, new paresthesia, shortness of breath or wheeze.       Exam:       General: alert and oriented ×3 no acute distress.       HEENT: pupils are equal round and reactive to light extraocular motion is intact. Normocephalic and atraumatic.        Hearing is grossly normal and there is no otorrhea.        Nares are patent there is no rhinorrhea.        Mucous membranes are moist and pink.       Chest: has bilateral rise with no increased work of breathing.       Cardiovascular: normal perfusion and brisk capillary refill.       Musculoskeletal: no gross focal abnormalities and normal gait.       right shoulder limited ROM and ttp at biceps tendon       Neuro: no gross focal abnormalities and memory seems intact.       Psychiatric: speech is clear and coherent and fluent. Patient dressed appropriately for the weather. Mood is appropriate and affect is full.                        Discussed with patient to return to clinic if symptoms worsen or do not improve  Assessment/Plan       Biceps tendonitis (M75.21)         Ordered:          acetaminophen-oxycodone, 1 tab(s), po, bid, PRN: pain, # 50 tab(s), 0 Refill(s), Type: Maintenance, [id number], 1 tab(s) Oral bid,PRN:pain, (Ordered)          diclofenac topical, ( 2 gm ), Topical, qid, Instructions: not to exceed 8 grams/day/single joint of upper  extremities, PRN: for pain, # 100 gm, 1 Refill(s), Type: Maintenance, Pharmacy: [facility] [id number], 2 gm Topical qid,PRN:for pain,Instr:not to exceed 8 grams..., (Ordered)                Subacromial impingement (M75.40)         Ordered:          acetaminophen-oxycodone, 1 tab(s), po, bid, PRN: pain, # 50 tab(s), 0 Refill(s), Type: Maintenance, Pharmacy: [facility] [id number], 1 tab(s) Oral bid,PRN:pain, (Ordered)          diclofenac topical, ( 2 gm ), Topical, qid, Instructions: not to exceed 8 grams/day/single joint of upper extremities, PRN: for pain, # 100 gm, 1 Refill(s), Type: Maintenance, Pharmacy: [facility] [id number], 2 gm Topical qid,PRN:for pain,Instr:not to exceed 8 grams..., (Ordered)               will put pt into a sling for work and have no use of right arm while at work but encouraged to do home PT and ice q 1 hour, surgery scheduled for 2/25/19, 25 minutes spent with patient in direct face to face contact, > 50% of time spent counseling and coordinating care.

## 2022-02-15 NOTE — NURSING NOTE
Depression Screening Entered On:  5/6/2020 3:54 PM CDT    Performed On:  5/6/2020 3:54 PM CDT by Rose Walsh CMA               Depression Screening   Little Interest - Pleasure in Activities :   Nearly every day   Feeling Down, Depressed, Hopeless :   Nearly every day   Initial Depression Screen Score :   6    Poor Appetite or Overeating :   More than half the days   Trouble Falling or Staying Asleep :   Not at all   Feeling Tired or Little Energy :   Nearly every day   Feeling Bad About Yourself :   Nearly every day   Trouble Concentrating :   Nearly every day   Moving or Speaking Slowly :   Not at all   Thoughts Better Off Dead or Hurting Self :   More than half the days   Detailed Depression Screen Score :   13    Total Depression Screen Score :   19    Rose Walsh CMA - 5/6/2020 3:54 PM CDT

## 2022-02-15 NOTE — PROGRESS NOTES
Chief Complaint    Med check. Verbal consent given for video visit.  History of Present Illness      Today's visit was conducted via telemedicine, video,  me in clinic, patient in her car in [facility] [place], due to the COVID-19 pandemic.       Patient consent, as follows, for telemedicine visit was obtained.   You have been scheduled for a telemedicine visit, which is a billable service.  This is a replacement for a face-to-face visit that is being recommended at this time to help keep our patient safe.  If, during our visit , we decide that you need a face-to-face visit, this visit will be canceled and you will be rescheduled to come into the clinic for a face to face appointment.  Can we proceed with a telemedicine visit?       HPI      Patient reports that increasing her duloxetine made her symptoms change from being more depressed to being angry and irritable.  It made her feel generally uncomfortable and she decided to go back down to 60 mg 1 p.o. daily on the duloxetine.  She still needs to have the depression treated but felt worse on the duloxetine.  She is found that the Depakote has been very effective in helping with her mood.  She is not having any suicidal ideation or homicidal ideation.  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      ROS 10 point ROS is neg except as per HPI      Review of systems is negative except as per HPI including:  no fevers, chills, sore throat, runny nose, nausea, vomiting, constipation, diarrhea, rash or new skin lesions, chest pain, palpitations, slurred speech, new paresthesia, shortness of breath or wheeze.      Exam:      General: alert and oriented ×3 no acute distress.      HEENT: pupils are equal round and reactive to light extraocular motion is intact. Normocephalic and atraumatic.       Hearing is grossly normal and there is no otorrhea.       Nares are patent there is no rhinorrhea.       Mucous membranes are moist and pink.      Chest: has bilateral rise  with no increased work of breathing.      Cardiovascular: normal perfusion and brisk capillary refill.      Musculoskeletal: no gross focal abnormalities and normal gait.      Neuro: no gross focal abnormalities and memory seems intact.      Psychiatric: speech is clear and coherent and fluent. Patient dressed appropriately for the weather. Mood is depressed and anxious and affect is full.  judgement and insight are normal, no A/V hallucinations, no S/H ideation.  thought process linear                     Discussed with patient to return to clinic if symptoms worsen or do not improve      Discussed:      Contact clinic if sx worsen or do not improve.       Also discussed methods to reduce risks of Covid-19 including social isolation and avoid touching face and frequent, adequate hand washing.  If you develop upper respiratory symptoms then call the clinic or the ED to discuss whether or not you should come in for further evaluation and treatment.  Physical Exam   Vitals & Measurements    HT: 66 in  HT: 66 in   Assessment/Plan       Bipolar (F31.32)         Ordered:          divalproex sodium, = 1 tab(s) ( 500 mg ), Oral, daily, # 30 tab(s), 0 Refill(s), Type: Maintenance, Pharmacy: [facility] #[id number], 1 tab(s) Oral daily, 66, in, 07/23/20 8:57:00 CDT, Height Measured, 174, lb, 05/14/20 14:26:00 CDT, Weight Measured, (Ordered)          Return to Clinic (Request), RFV: lab only trough valproic acid level and cmp, Return in 1 weeks          Return to Clinic (Request), Return in 3 week telemed ok, f/u mood                Orders:         divalproex sodium, = 3 tab(s), Oral, daily, # 270 tab(s), 1 Refill(s), Type: Maintenance, Pharmacy: [facility] #[id number], 3 tab(s) Oral daily, (Completed)         Return to Clinic (Request), RFV: f/u mood 2 weeks, Return in 2 weeks         Return to Clinic (Request), RFV: lab only CMP, Return in 1 week         Return to Clinic (Request), Return in 6 months      We will  plan to have patient stay on the duloxetine 60 mg 1 p.o. daily and will try increasing the Depakote to 1000 mg daily.  She will return to clinic next week to have her liver enzymes checked.  We will plan to follow-up in 1 to 3 weeks.  She is contracted today against suicide.  Patient Information     Name:MATHEUS REARDON      Address:      [address] [place] [address] [address]      [place], [place] [id number]     Sex:Female     YOB: 1990     [phone]     Emergency Contact:STAR DRAKE     MRN:[id number]     FIN:[id number]     Location:[facility]     Date of Service:07/23/2020      Primary Care Physician:       Rhianna Bryant MD, [phone]      Attending Physician:       Rhianna Bryant MD, [phone]  Problem List/Past Medical History    Ongoing     Biceps tendonitis     Bipolar     Subacromial impingement    Historical     Kidney stone     Pregnancy     Pregnancy  Procedure/Surgical History     Arthroscopy of shoulder - Right (01/06/2020)      Comments: Arthroscopic tenotomy of the long head biceps tendon..     Subacromial decompression (02/25/2019)      Comments: Right. including anterior acromioplasty..     Tonsillectomy and adenoidectomy (04/23/2018)     Tubal sterilization (12/02/2011)     NVD (Normal Vaginal Delivery) (08/25/2011)      Comments: Female - 39w1d..     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 Activity level: Moderate physical work., 12/19/2017      Home/Environment      Marital status: Single. Spouse/Partner name: shares custody with ex-partner. Lives with Children, Significant other. 3 children. Living situation: Home/Independent., 12/19/2017     Nutrition/Health      Type of diet: Regular., 05/27/2015     Sexual      Sexually active: Yes. Sexual orientation: Heterosexual., 05/27/2015     Tobacco      Former smoker, quit more than 30 days ago, 02/26/2018  Family History    Diabetes mellitus: Grandmother (M).    Heart disease: Grandmother (M).  Immunizations      Vaccine Date Status          influenza virus vaccine, inactivated 12/19/2019 Given          tetanus/diphth/pertuss (Tdap) adult/adol 08/26/2011 Recorded              Comments : [facility]          influenza virus vaccine, inactivated 09/17/2009 Recorded          Td 07/26/2008 Recorded          rubella 09/13/2007 Recorded          Td 06/10/2004 Recorded          Hep B 11/10/1999 Recorded          Hep B 10/21/1998 Recorded          Hep B 06/17/1998 Recorded          varicella 05/08/1998 Recorded          Hep B 05/08/1998 Recorded          OPV 08/19/1993 Recorded          DTaP 08/19/1993 Recorded          MMR (measles/mumps/rubella) 08/19/1993 Recorded          Hib 04/01/1993 Recorded          OPV 04/23/1991 Recorded          DTaP 04/23/1991 Recorded          MMR (measles/mumps/rubella) 04/23/1991 Recorded          OPV 03/01/1989 Recorded          DTaP 03/01/1989 Recorded          OPV 1988 Recorded          DTaP 1988 Recorded          OPV 1988 Recorded          DTaP 1988 Recorded  Lab Results       Lab Results (Last 4 results within 90 days)        Sodium Level: 140 mmol/L [135 mmol/L - 146 mmol/L] (05/14/20 14:19:00)       Potassium Level: 3.9 mmol/L [3.5 mmol/L - 5.3 mmol/L] (05/14/20 14:19:00)       Chloride Level: 104 mmol/L [98 mmol/L - 110 mmol/L] (05/14/20 14:19:00)       CO2 Level: 30 mmol/L [20 mmol/L - 32 mmol/L] (05/14/20 14:19:00)       Glucose Level: 113 mg/dL High [65  mg/dL - 99 mg/dL] (05/14/20 14:19:00)       BUN: 11 mg/dL [7 mg/dL - 25 mg/dL] (05/14/20 14:19:00)       Creatinine Level: 0.81 mg/dL [0.5 mg/dL - 1.1 mg/dL] (05/14/20 14:19:00)       BUN/Creat Ratio: NOT APPLICABLE [6  - 22] (05/14/20 14:19:00)       eGFR: 97 mL/min/1.73m2 (05/14/20 14:19:00)       eGFR : 112 mL/min/1.73m2 (05/14/20 14:19:00)       Calcium Level: 8.9 mg/dL [8.6 mg/dL - 10.2 mg/dL] (05/14/20 14:19:00)       Bilirubin Total: 0.5 mg/dL [0.2 mg/dL - 1.2 mg/dL] (05/14/20 14:19:00)       Alkaline Phosphatase: 55 unit/L [31 unit/L - 125 unit/L] (05/14/20 14:19:00)       AST/SGOT: 14 unit/L [10 unit/L - 30 unit/L] (05/14/20 14:19:00)       ALT/SGPT: 12 unit/L [6 unit/L - 29 unit/L] (05/14/20 14:19:00)       Protein Total: 6.3 gm/dL [6.1 gm/dL - 8.1 gm/dL] (05/14/20 14:19:00)       Albumin Level: 4.4 gm/dL [3.6 gm/dL - 5.1 gm/dL] (05/14/20 14:19:00)       Globulin: 1.9 [1.9  - 3.7] (05/14/20 14:19:00)       A/G Ratio: 2.3 [1  - 2.5] (05/14/20 14:19:00)

## 2022-02-15 NOTE — NURSING NOTE
Depression Screening Entered On:  4/1/2021 12:51 PM CDT    Performed On:  4/1/2021 12:51 PM CDT by Helen Jacobs               Depression Screening   Little Interest - Pleasure in Activities :   Not at all   Feeling Down, Depressed, Hopeless :   Not at all   Initial Depression Screen Score :   0 Score   Poor Appetite or Overeating :   Several days   Trouble Falling or Staying Asleep :   Not at all   Feeling Tired or Little Energy :   Several days   Feeling Bad About Yourself :   Not at all   Trouble Concentrating :   Several days   Moving or Speaking Slowly :   Not at all   Thoughts Better Off Dead or Hurting Self :   Not at all   Difficulty at Work, Home, Getting Along :   Somewhat difficult   Detailed Depression Screen Score :   3    Total Depression Screen Score :   3    Helen Jacobs - 4/1/2021 12:51 PM CDT

## 2022-02-15 NOTE — PROGRESS NOTES
Subjective      continued right shoulder pain, spoke with [facility] and got a copy of note with Dr. Wright, thinks she has impingment syndrome and possible tear of biceps tendon.  she had to go to [place] for VALDEMAR exam and is awaiting the results of the VALDEMAR, has surgery scheduled for 2/25/19.  attempted to speak with Francis Acevedo, her  , to see what the VALDEMAR MD had to say.  He was verbally abusive on the phone and not helpful in any way.  Explained I'm trying to get info so I can know how to treat [pt as she is presenting with ongoing pain.  With pt's consent I left a message for his boss to see if he could assist us in getting info for patient.  Her shoulder pain is radiating to her upper arm, there is weakness and paresthesias.  She has a hard time sleeping secondary to pain.      Spoke with Dr. Wright's asst, she did not think Dr. Wright would want to do any type of steroid injection this close to surgery, and that he may offer some narcotics to help pt sleep.  Chart review shows tramadol would interact with another of her meds, so do not want to order this.   Review of Systems       neg except as per HPI  Objective   Vitals & Measurements    T: 96.5   F (Tympanic)  HR: 66(Peripheral)  BP: 100/58  WT: 168 lb    Physical Exam       Review of systems is negative except as per HPI including:  no fevers, chills, sore throat, runny nose, nausea, vomiting, constipation, diarrhea, rash or new skin lesions, chest pain, palpitations, slurred speech, new paresthesia, shortness of breath or wheeze.       Exam:       General: alert and oriented ×3 no acute distress.       HEENT: pupils are equal round and reactive to light extraocular motion is intact. Normocephalic and atraumatic.        Hearing is grossly normal and there is no otorrhea.        Nares are patent there is no rhinorrhea.        Mucous membranes are moist and pink.       Chest: has bilateral rise with no increased work of breathing.        Cardiovascular: normal perfusion and brisk capillary refill.       Musculoskeletal: no gross focal abnormalities and normal gait.  right shoulder + Schaefer sign, tender to palpation on the biceps tendon,       Neuro: no gross focal abnormalities and memory seems intact.       Psychiatric: speech is clear and coherent and fluent. Patient dressed appropriately for the weather. Mood is appropriate and affect is full.                        Discussed with patient to return to clinic if symptoms worsen or do not improve  Assessment/Plan       Biceps tendonitis (M75.21)         Ordered:          acetaminophen-oxycodone, 1-2 tab(s), po, q6 hrs, PRN: as needed for pain, # 24 tab(s), 0 Refill(s), Type: Maintenance, Pharmacy: [facility] [id number], 1-2 tab(s) Oral q6 hrs,PRN:as needed for pain, (Ordered)                Subacromial impingement (M75.40)         Ordered:          acetaminophen-oxycodone, 1-2 tab(s), po, q6 hrs, PRN: as needed for pain, # 24 tab(s), 0 Refill(s), Type: Maintenance, Pharmacy: [facility] [id number], 1-2 tab(s) Oral q6 hrs,PRN:as needed for pain, (Ordered)                Orders:      pt will use her percocet sparingly, she will rtc in about 2 weeks to have preop, discussed with pt that I was given the impression that here in [place] a lot of times you have to use your regular insurance to pay and then your insurance company may work with the  to pay the bill off.  If she wants to proceed with surgery regardless of VALDEMAR findings then consider stopping by [facility] to see how much money they need for her surgery.  15 minutes spent with patient in direct face to face contact, > 50% of time spent counseling and coordinating care.

## 2022-02-15 NOTE — TELEPHONE ENCOUNTER
---------------------  From: Rhianna Bryant MD   To: Appointment Pool ([id number] - [place]);     Sent: 7/23/2020 9:22:59 AM CDT  Subject: General Message     pls help pt schedule an appointment next week lab only at around 5 pm to check a trough valproic acid level and CMP, thanksPT SCHEDULED JULY 28 FOR LAB

## 2022-02-15 NOTE — PROGRESS NOTES
Chief Complaint    med check. cant sleep started after shoulder surgery  History of Present Illness      patient present to clinic today insomnia following her right shoulder surgery.  pain is doing good, no longer needing narcotics, feels achey but ok, like she slept on it funny, has found out she is not allowed to go back to work without restrictions within her employers time frame, and if she can't do this she will be fired, so there is incveased anxiety.  hx of bipolar disease, no other manic symptoms at this time, mood had been well controlled on depakote with duloxetine.        Review of systems 12 point review of systems negative except as per HPI      Exam:      General: alert and oriented ×3 no acute distress.      HEENT: pupils are equal round and reactive to light extraocular motion is intact. Normocephalic and atraumatic.       Hearing is grossly normal and there is no otorrhea. TM pearly grey with normal likght reflex      Nares are patent there is no rhinorrhea.       Mucous membranes are moist and pink.      Chest: has bilateral rise with no increased work of breathing.  ctab no wheezes,  rhonchi or rales      Cardiovascular: normal perfusion and brisk capillary refill.  nml s1s2, no m/g/r      Musculoskeletal: no gross focal abnormalities and normal gait.      Neuro: no gross focal abnormalities and memory seems intact.      Psychiatric: speech is clear and coherent and fluent. Patient dressed appropriately for the weather. Mood is depressed and affect is blunted.                     Discussed with patient to return to clinic if symptoms worsen or do not improve  Physical Exam   Vitals & Measurements    HR: 69(Peripheral)  BP: 90/60  SpO2: 98%     WT: 170.6 lb   Assessment/Plan       Bipolar (F31.9)          will try increasing the depakote from 500 to 750, will give a 1 time Rx for ambien, cautioned to take it once she is in bed and not get out of bed until morning, giving herself minimun of 8 hours  of sleep,  and not to drive if drowsy.  15 minutes spent with patient in direct face to face contact, > 50% of time spent counseling and coordinating care.          Ordered:          divalproex sodium, = 3 tab(s) ( 750 mg ), Oral, daily, # 90 tab(s), 1 Refill(s), Type: Maintenance, Pharmacy: [facility] [id number], 3 tab(s) Oral daily, (Ordered)          Return to Clinic (Request), RFV: follow up mood and insomnia, Return in 2 weeks                Insomnia (G47.00)         Ordered:          divalproex sodium, = 3 tab(s) ( 750 mg ), Oral, daily, # 90 tab(s), 1 Refill(s), Type: Maintenance, Pharmacy: [facility] [id number], 3 tab(s) Oral daily, (Ordered)          zolpidem, = 1 tab(s) ( 5 mg ), Oral, hs, PRN: as needed for insomnia, # 30 tab(s), 0 Refill(s), Type: Maintenance, Pharmacy: [facility] [id number], 1 tab(s) Oral hs,PRN:as needed for insomnia, (Ordered)          Return to Clinic (Request), RFV: follow up mood and insomnia, Return in 2 weeks           Patient Information     Name:MATHEUS REARDON      Address:      [address] [place] [id number]     Sex:Female     YOB: 1990     [phone]     Emergency Contact:[facility], CONTACT     MRN:[id number]     FIN:[id number]     Location[facility]     Date of Service:03/12/2019      Primary Care Physician:       Rhianna Bryant MD, [phone]      Attending Physician:       Rhianna Bryant MD, [phone]  Problem List/Past Medical History    Ongoing     Biceps tendonitis     Bipolar     Subacromial impingement    Historical     Kidney stone     Pregnancy     Pregnancy  Procedure/Surgical History     Subacromial decompression (02/25/2019)            Comments: Right. including anterior acromioplasty..     Tubal sterilization (12/02/2011)           NVD (Normal Vaginal Delivery) (08/25/2011)            Comments: Female - 39w1d..     Extraction of wisdom tooth        Medications      triamcinolone 0.1% topical ointment: 1 jac, TOP, TID, for 7 day(s), PRN: for rash, 30 gm, 1 Refill(s).     lidocaine 5% topical ointment: 1 jac, TOP, TID, for 7 day(s), PRN: for itching, 35 gm, 1 Refill(s).     meloxicam 15 mg oral tablet: 15 mg, 1 tab(s), PO, Daily, PRN: for pain, 30 tab(s), 3 Refill(s).     Percocet 5/325 oral tablet: 1-2 tab(s), po, q6 hrs, PRN: as needed for pain, 24 tab(s), 0 Refill(s).     Percocet 10/325 oral tablet: 1 tab(s), po, bid, PRN: pain, 50 tab(s), 0 Refill(s).     Voltaren 1% topical gel: 2 gm, Topical, qid, not to exceed 8 grams/day/single joint of upper extremities, PRN: for pain, 100 gm, 1 Refill(s).     DULoxetine 60 mg oral delayed release capsule: 1 cap(s), Oral, daily, 90 cap(s).     divalproex sodium 500 mg oral tablet, extended release: 1 tab(s), Oral, daily, 90 tab(s).     zolpidem 5 mg oral tablet: 5 mg, 1 tab(s), Oral, hs, PRN: as needed for insomnia, 30 tab(s), 0 Refill(s).     Depakote  mg oral tablet, extended release: 750 mg, 3 tab(s), Oral, daily, 90 tab(s), 1 Refill(s).          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 Activity level: Moderate physical work., 12/19/2017     Home and Environment      Marital status: Single. Spouse/Partner name: shares custody with ex-partner. Lives with Children, Significant other. 3 children. Living situation: Home/Independent., 12/19/2017     Nutrition and Health      Type of diet: Regular., 05/27/2015     Sexual      Sexually active: Yes. Sexual orientation: Heterosexual., 05/27/2015     Tobacco      Former smoker, quit more than 30 days ago, 02/26/2018  Family History    Diabetes mellitus: Grandmother (M).    Heart disease: Grandmother (M).  Immunizations      Vaccine Date Status  Comments      tetanus/diphth/pertuss (Tdap) adult/adol 08/26/2011 Recorded [facility]      influenza virus vaccine, inactivated 09/17/2009 Recorded      Td 07/26/2008 Recorded      rubella 09/13/2007 Recorded      Td 06/10/2004 Recorded      Hep B 11/10/1999 Recorded      Hep B 10/21/1998 Recorded      Hep B 06/17/1998 Recorded      varicella 05/08/1998 Recorded      Hep B 05/08/1998 Recorded      OPV 08/19/1993 Recorded      DTaP 08/19/1993 Recorded      MMR (measles/mumps/rubella) 08/19/1993 Recorded      Hib 04/01/1993 Recorded      OPV 04/23/1991 Recorded      DTaP 04/23/1991 Recorded      MMR (measles/mumps/rubella) 04/23/1991 Recorded      OPV 03/01/1989 Recorded      DTaP 03/01/1989 Recorded      OPV 1988 Recorded      DTaP 1988 Recorded      OPV 1988 Recorded      DTaP 1988 Recorded

## 2022-02-15 NOTE — NURSING NOTE
Depression Screening Entered On:  1/8/2021 9:14 AM CST    Performed On:  1/5/2021 9:13 AM CST by Nusrat Crum               Depression Screening   Little Interest - Pleasure in Activities :   Nearly every day   Feeling Down, Depressed, Hopeless :   Nearly every day   Initial Depression Screen Score :   6 Score   Poor Appetite or Overeating :   More than half the days   Trouble Falling or Staying Asleep :   Nearly every day   Feeling Tired or Little Energy :   More than half the days   Feeling Bad About Yourself :   Nearly every day   Trouble Concentrating :   Nearly every day   Moving or Speaking Slowly :   Nearly every day   Thoughts Better Off Dead or Hurting Self :   Not at all   MARY Difficulty with Work, Home, Others :   Extremely difficult   Detailed Depression Screen Score :   16    Total Depression Screen Score :   22    Nusrat Crum - 1/8/2021 9:13 AM CST

## 2022-02-15 NOTE — PROGRESS NOTES
Chief Complaint    med check, she would also like to discuss getting an emotional support animal documentation for her cat.  History of Present Illness      pt has cats, they help her know when to go to bed, decrease anxiety, bring chung, she needs a letter for an ANJU to have them with her without having to get them declawed.  dayanara 7 = 5, phq9=3      tobacco use, interested in quitting but thinks it will have to wait until after the move,  from boyfriend from 3 year relationship, she and her 3 kids will be moving into an apartment of their own         Physical Exam   Vitals & Measurements    HR: 95 (Peripheral)  RR: 16  BP: 98/60  SpO2: 99%     HT: 66 in  WT: 181.3 lb       psych mood is neutral, affect full, god insight and judgment, no psychomotor aggitation, no a/v hallucintaion, thought process logical, no s/h ideation  Assessment/Plan       Bipolar (F31.32)        currently well controlled, provided letter for ANJU, renewed current meds         Ordered:          Return to Clinic (Request), RFV: revisit tobacco cessation, telemed ok, Return in 6 weeks                Tobacco user (Probable) (Z72.0)        3 minutes spent discussing tobacco cessation, be mindful of choice to smoke, try to delay the cigarette, identify triggers and work on developing ways to avoid these, develop some new activities to do instead of smoking, we will f/u in 6 weeks and see if patient is ready to set a quit date         Ordered:          Return to Clinic (Request), RFV: revisit tobacco cessation, telemed ok, Return in 6 weeks               In addition to time spent counseling patient for tobacco cessation today, total time spent reviewing chart and preparing for appointment, with patient for appointment, and time spent charting and coordinating care on the day of the appointment in minutes was:32  Patient Information     Name:MATHEUS REARDON HELIO      Address:      [address] [place] [address] [address] [place] [id number]      Sex:Female     YOB: 1990     [phone]     Emergency Contact:STAR DRAKE     MRN:[id number]     FIN:[id number]     Location:[facility]     Date of Service:04/01/2021      Primary Care Physician:       Rhianna Bryant MD, [phone]      Attending Physician:       Rhianna Bryant MD, [phone]  Problem List/Past Medical History    Ongoing     Biceps tendonitis     Bipolar     Obesity     Subacromial impingement     Tobacco user    Historical     Kidney stone     Pregnancy     Pregnancy  Procedure/Surgical History     Arthroscopy of shoulder - Right (01/06/2020)            Comments: Arthroscopic tenotomy of the long head biceps tendon..     Subacromial decompression (02/25/2019)            Comments: Right. including anterior acromioplasty..     Tonsillectomy and adenoidectomy (04/23/2018)           Tubal sterilization (12/02/2011)           NVD (Normal Vaginal Delivery) (08/25/2011)            Comments: Female - 39w1d..     Extraction of wisdom tooth        Medications    divalproex sodium 500 mg oral tablet, extended release, 1 tab(s), Oral, daily, 1 refills    DULoxetine 60 mg oral delayed release capsule, 1 cap(s), Oral, daily    QUEtiapine 25 mg oral tablet, 1/2 tab(s)-1 tab, Oral, qhs, 5 refills  Allergies    Biaxin    Keflex  Social History    Smoking Status     Current every day smoker     Alcohol      Never     Electronic Cigarette/Vaping      Electronic Cigarette Use: Never.     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 Activity level: Moderate physical work.     Home/Environment      Marital status: Single. Spouse/Partner name: shares custody with ex-partner. Lives with Children, Significant other. 3 children. Living situation: Home/Independent.     Nutrition/Health      Type of diet: Regular.     Sexual      Sexually active: Yes. Sexual orientation: Heterosexual.      Tobacco      5-9 cigarettes (between 1/4 to 1/2 pack)/day in last 30 days, Cigarettes  Family History    Diabetes mellitus: Grandmother (M).    Heart disease: Grandmother (M).  Immunizations      Vaccine Date Status          influenza virus vaccine, inactivated 12/19/2019 Given          tetanus/diphth/pertuss (Tdap) adult/adol 08/26/2011 Recorded              Comments : [facility]          influenza virus vaccine, inactivated 09/17/2009 Recorded          Td 07/26/2008 Recorded          rubella 09/13/2007 Recorded          Td 06/10/2004 Recorded          Hep B 11/10/1999 Recorded          Hep B 10/21/1998 Recorded          Hep B 06/17/1998 Recorded          varicella 05/08/1998 Recorded          Hep B 05/08/1998 Recorded          OPV 08/19/1993 Recorded          MMR (measles/mumps/rubella) 08/19/1993 Recorded          DTaP 08/19/1993 Recorded          Hib 04/01/1993 Recorded          Hib (HbOC) 04/01/1993 Recorded          OPV 04/23/1991 Recorded          MMR (measles/mumps/rubella) 04/23/1991 Recorded          DTaP 04/23/1991 Recorded          OPV 03/01/1989 Recorded          DTaP 03/01/1989 Recorded          OPV 1988 Recorded          DTaP 1988 Recorded          OPV 1988 Recorded          DTaP 1988 Recorded

## 2022-02-15 NOTE — NURSING NOTE
Comprehensive Intake Entered On:  8/20/2019 5:01 PM CDT    Performed On:  8/20/2019 4:51 PM CDT by Rose Terry CMA               Summary   Chief Complaint :   patient here today to fill out paperwork. Adverse reactions to mosquito bites.    Menstrual Status :   Menarcheal   Weight Measured :   166.8 lb(Converted to: 166 lb 13 oz, 75.66 kg)    Systolic Blood Pressure :   116 mmHg   Diastolic Blood Pressure :   60 mmHg   Mean Arterial Pressure :   79 mmHg   Peripheral Pulse Rate :   94 bpm   BP Site :   Right arm   BP Method :   Manual   HR Method :   Electronic   Temperature Tympanic :   98.5 DegF(Converted to: 36.9 DegC)    Oxygen Saturation :   99 %   Rose Terry CMA - 8/20/2019 4:51 PM CDT   Health Status   Allergies Verified? :   Yes   Medication History Verified? :   Yes   Immunizations Current :   Yes   Pre-Visit Planning Status :   Completed   Tobacco Use? :   Light tobacco smoker   Rose Terry CMA - 8/20/2019 4:51 PM CDT   Consents   Consent for Immunization Exchange :   Consent Granted   Consent for Immunizations to Providers :   Consent Granted   Rose Terry CMA - 8/20/2019 4:51 PM CDT   Meds / Allergies   (As Of: 8/20/2019 5:01:06 PM CDT)   Allergies (Active)   Biaxin  Estimated Onset Date:   Unspecified ; Created By:   Yadi Osborn LPN; Reaction Status:   Active ; Substance:   Biaxin ; Updated By:   Yadi Osborn LPN; Reviewed Date:   3/27/2019 12:30 PM CDT      Keflex  Estimated Onset Date:   Unspecified ; Created By:   Yadi Osborn LPN; Reaction Status:   Active ; Category:   Drug ; Substance:   Keflex ; Type:   Allergy ; Updated By:   Yadi Osborn LPN; Reviewed Date:   3/27/2019 12:30 PM CDT        Medication List   (As Of: 8/20/2019 5:01:06 PM CDT)   Prescription/Discharge Order    acetaminophen-oxycodone  :   acetaminophen-oxycodone ; Status:   Prescribed ; Ordered As Mnemonic:   Percocet 10/325 oral tablet ; Simple Display Line:   1 tab(s), po, bid, PRN: pain, 50 tab(s), 0 Refill(s) ;  Ordering Provider:   Rhianna Bryant MD; Catalog Code:   acetaminophen-oxycodone ; Order Dt/Tm:   2/1/2019 1:17:36 PM          acetaminophen-oxycodone  :   acetaminophen-oxycodone ; Status:   Prescribed ; Ordered As Mnemonic:   Percocet 5/325 oral tablet ; Simple Display Line:   1-2 tab(s), po, q6 hrs, PRN: as needed for pain, 24 tab(s), 0 Refill(s) ; Ordering Provider:   Rhianna Bryant MD; Catalog Code:   acetaminophen-oxycodone ; Order Dt/Tm:   1/21/2019 10:29:40 AM          diclofenac topical  :   diclofenac topical ; Status:   Prescribed ; Ordered As Mnemonic:   Voltaren 1% topical gel ; Simple Display Line:   2 gm, Topical, qid, not to exceed 8 grams/day/single joint of upper extremities, PRN: for pain, 100 gm, 1 Refill(s) ; Ordering Provider:   Rhianna Bryant MD; Catalog Code:   diclofenac topical ; Order Dt/Tm:   2/1/2019 1:19:14 PM          divalproex sodium  :   divalproex sodium ; Status:   Prescribed ; Ordered As Mnemonic:   Depakote  mg oral tablet, extended release ; Simple Display Line:   750 mg, 3 tab(s), Oral, daily, 90 tab(s), 1 Refill(s) ; Ordering Provider:   Rhianna Bryant MD; Catalog Code:   divalproex sodium ; Order Dt/Tm:   3/12/2019 4:00:02 PM          divalproex sodium 500 mg oral tablet, extended release  :   divalproex sodium 500 mg oral tablet, extended release ; Status:   Prescribed ; Ordered As Mnemonic:   divalproex sodium 500 mg oral tablet, extended release ; Simple Display Line:   1 tab(s), Oral, daily, 90 tab(s) ; Ordering Provider:   Rhianna Bryant MD; Catalog Code:   divalproex sodium ; Order Dt/Tm:   3/8/2019 10:00:59 AM          DULoxetine 60 mg oral delayed release capsule  :   DULoxetine 60 mg oral delayed release capsule ; Status:   Prescribed ; Ordered As Mnemonic:   DULoxetine 60 mg oral delayed release capsule ; Simple Display Line:   1 cap(s), Oral, daily, 90 cap(s) ; Ordering Provider:   Rhianna Bryant MD; Catalog Code:   DULoxetine ; Order Dt/Tm:    6/24/2019 7:51:31 PM          lidocaine topical  :   lidocaine topical ; Status:   Prescribed ; Ordered As Mnemonic:   lidocaine 5% topical ointment ; Simple Display Line:   1 jac, TOP, TID, for 7 day(s), PRN: for itching, 35 gm, 1 Refill(s) ; Ordering Provider:   Rhianna Bryant MD; Catalog Code:   lidocaine topical ; Order Dt/Tm:   8/8/2018 9:17:58 AM          meloxicam  :   meloxicam ; Status:   Prescribed ; Ordered As Mnemonic:   meloxicam 15 mg oral tablet ; Simple Display Line:   15 mg, 1 tab(s), PO, Daily, PRN: for pain, 30 tab(s), 3 Refill(s) ; Ordering Provider:   Rhianna Bryant MD; Catalog Code:   meloxicam ; Order Dt/Tm:   9/11/2018 2:51:01 PM          QUEtiapine  :   QUEtiapine ; Status:   Prescribed ; Ordered As Mnemonic:   QUEtiapine 25 mg oral tablet ; Simple Display Line:   1/2 tab(s), Oral, qhs, 15 EA, 3 Refill(s) ; Ordering Provider:   Rhianna Bryant MD; Catalog Code:   QUEtiapine ; Order Dt/Tm:   3/27/2019 1:08:51 PM          triamcinolone topical  :   triamcinolone topical ; Status:   Prescribed ; Ordered As Mnemonic:   triamcinolone 0.1% topical ointment ; Simple Display Line:   1 jac, TOP, TID, for 7 day(s), PRN: for rash, 30 gm, 1 Refill(s) ; Ordering Provider:   Rhianna Bryant MD; Catalog Code:   triamcinolone topical ; Order Dt/Tm:   8/8/2018 9:17:45 AM          zolpidem  :   zolpidem ; Status:   Prescribed ; Ordered As Mnemonic:   zolpidem 5 mg oral tablet ; Simple Display Line:   5 mg, 1 tab(s), Oral, hs, PRN: as needed for insomnia, 30 tab(s), 0 Refill(s) ; Ordering Provider:   Rhianna Bryant MD; Catalog Code:   zolpidem ; Order Dt/Tm:   3/12/2019 4:02:08 PM

## 2022-02-15 NOTE — PROGRESS NOTES
Subjective      On 9/8/ 18 pt was at work placing some gasket when someone came behind her to talk with her and she was startled.  She injured her shoulder.  She placed ice immediately for 45 minutes.  Right shoulder was first injured 2/4/17.  Has been seeing Dr. Ramos with [facility].  On Friday pt had a meeting with work regarding her shoulder because it was still bothering her and causing pain, she was moved to a new position.  This past Sunday she was startled when a coworker tried to talk with her and hurt her shoulder from the abrupt movement.  Was told she had a strained AC joint  when first injured.   Second injury was tendonitis of the bicep and rotator cuff.  Has seen OT/PT, last saw them Summer 2017.  Pain now at rest 6/10, with movement 8/10.  At best in the past 2 weeks was 4/10.  burning, aching, shoots down arm and to back of shoulder blade.  some icy sensations also.  occ pins and needles in  the fingers.  does not drop things.  Does not recall if she has ever had xr.  she has been told to take ibuprofen 800 mg qid,      this can be upsetting to her stomach and is hard to remember to do      xr done today shows no arthritis/DJD           HPI pt has been having pain in his shoulder for the past 2-3 weeks, makes it difficult to lay down on his left side, feel  a little better with nsaids but then worsens again, no discreet injury, no weakness, no loss of sensation or motor control.                             Procedure note left subacromial bursa injection            Informed consent obtained including risks of pain, bleeding, infection, injury to surrounding tissue, worsening of condition, and need for further treatment.   Steroids can cause increased blood sugars, a steroid induced psychosis, irritability, insomnia, and increased appetite.      Benefit/goal of a subacromial  steroid  injection is to reduce pain.  I cannot guarantee that will will be any pain relief.            Alternatives would include  doing nothing, physical therapy, acupuncture, using heat or ice, continuing with oral medications such as tylenol or  nonsteroidal anti-inflammatory medications or topical medications such as a lidocaine patch or cream or menthol or diclofenac., or referral to another physician or Orthopedic Surgeon             Prep: Betadine and  Alcohol           Location identified by my palpation and communication with patient.  Left subacromial bursa injected with a lateral approach                        This was injected with  2ml of 1% lidocaine without epinephrine and 1 ml of 40mg/1ml Kenalog.                      pain prior to treatment: 8           pain following injection: 4           Complications: none           Tolerated procedure well.      EBL: < 1 ml   Review of Systems       no fevers, chills, sore throat, runny nose, nausea, vomiting, constipation, rash or new skin lesions, chest pain, palpitations, slurred speech, new paresthesia, shortness of breath or wheeze.  Objective   Vitals & Measurements    T: 98.1   F (Tympanic)  HR: 77(Peripheral)  BP: 98/54  SpO2: 98%  WT: 158.6 lb    Physical Exam       Exam:       General: alert and oriented ×3 no acute distress.       HEENT: pupils are equal round and reactive to light extraocular motion is intact. Normocephalic and atraumatic.        Hearing is grossly normal and there is no otorrhea.        Nares are patent there is no rhinorrhea.        Mucous membranes are moist and pink.       Chest: has bilateral rise with no increased work of breathing.       Cardiovascular: normal perfusion and brisk capillary refill.       Musculoskeletal: bilateral shoulders: neg dropped arm, neg empty can left, positive on the right, nml ROM, + [place],  right tenderness and fullness at subacromial bursa, stable shoulder joint, no dislocation, right proximal biceps tendon tenderness        Neuro: no gross focal abnormalities and memory seems intact.       Psychiatric: speech is clear and  coherent and fluent. Patient dressed appropriately for the weather. Mood is appropriate and affect is full.                      Assessment/Plan       Biceps tendonitis (M75.21)                NSAID long-term use (Z79.1)         Orders:          04143 office outpatient visit 15 minutes (Charge), Quantity: 1, Shoulder pain  NSAID long-term use          Basic Metabolic Panel* ([facility]), Specimen Type: Serum, Collection Date: 09/11/18 12:37:00 CDT                Shoulder pain (M25.511)         Orders:          meloxicam, = 1 tab(s) ( 15 mg ), PO, Daily, PRN: for pain, # 30 tab(s), 3 Refill(s), Type: Maintenance, Pharmacy: [id number], 1 tab(s) Oral daily,PRN:for pain, (Ordered)          57720 office outpatient visit 15 minutes (Charge), Quantity: 1, Shoulder pain  NSAID long-term use          Basic Metabolic Panel* ([facility]), Specimen Type: Serum, Collection Date: 09/11/18 12:37:00 CDT          Occupational Therapy Evaluation and Treatment (Request), Shoulder pain          Return to Clinic (Request), RFV: f/u shoulder pain, Return in 4 weeks          XR Shoulder Complete Right (Request), Priority: STAT, Shoulder pain                Subacromial impingement (M75.40)               Also see workability form.  judy cup ice massage several times per day  please note error regarding injection, right shoulder injected not left

## 2022-02-15 NOTE — NURSING NOTE
Comprehensive Intake Entered On:  4/1/2021 10:42 AM CDT    Performed On:  4/1/2021 10:36 AM CDT by Helen Jacobs               Summary   Chief Complaint :   med check, she would also like to discuss getting an emotional support animal documentation for her cat.    Menstrual Status :   Menarcheal   Weight Measured :   181.3 lb(Converted to: 181 lb 5 oz, 82.236 kg)    Height/Length Estimated :   66 in(Converted to: 5 ft 6 in, 167.64 cm)    Systolic Blood Pressure :   98 mmHg   Diastolic Blood Pressure :   60 mmHg   Mean Arterial Pressure :   73 mmHg   Peripheral Pulse Rate :   95 bpm   BP Site :   Right arm   BP Method :   Manual   Respiratory Rate :   16 br/min   Oxygen Saturation :   99 %   Helen Jacobs - 4/1/2021 10:36 AM CDT   Health Status   Allergies Verified? :   Yes   Medication History Verified? :   Yes   Immunizations Current :   Yes   Tobacco Use? :   Current every day smoker   Helen Jacobs - 4/1/2021 10:36 AM CDT   Consents   Consent for Immunization Exchange :   Consent Granted   Consent for Immunizations to Providers :   Consent Granted   Helen Jacobs - 4/1/2021 10:36 AM CDT   Meds / Allergies   (As Of: 4/1/2021 10:42:19 AM CDT)   Allergies (Active)   Biaxin  Estimated Onset Date:   Unspecified ; Created By:   Yadi Osborn LPN; Reaction Status:   Active ; Substance:   Biaxin ; Updated By:   Yadi Osborn LPN; Reviewed Date:   8/5/2020 11:31 AM CDT      Keflex  Estimated Onset Date:   Unspecified ; Created By:   Yadi Osborn LPN; Reaction Status:   Active ; Category:   Drug ; Substance:   Keflex ; Type:   Allergy ; Updated By:   Yadi Osborn LPN; Reviewed Date:   8/5/2020 11:31 AM CDT        Medication List   (As Of: 4/1/2021 10:42:19 AM CDT)   Prescription/Discharge Order    DULoxetine  :   DULoxetine ; Status:   Prescribed ; Ordered As Mnemonic:   DULoxetine 60 mg oral delayed release capsule ; Simple Display Line:   1 cap(s), Oral, daily, 30 cap(s), 0 Refill(s) ; Ordering  Provider:   Rhianna Bryant MD; Catalog Code:   DULoxetine ; Order Dt/Tm:   3/1/2021 7:36:46 AM CST          QUEtiapine  :   QUEtiapine ; Status:   Prescribed ; Ordered As Mnemonic:   QUEtiapine 25 mg oral tablet ; Simple Display Line:   1/2 tab(s)-1 tab, Oral, qhs, 30 EA, 5 Refill(s) ; Ordering Provider:   Rhianna Bryant MD; Catalog Code:   QUEtiapine ; Order Dt/Tm:   1/5/2021 4:10:18 PM CST          divalproex sodium  :   divalproex sodium ; Status:   Prescribed ; Ordered As Mnemonic:   divalproex sodium 500 mg oral tablet, extended release ; Simple Display Line:   1 tab(s), Oral, daily, 90 tab(s), 1 Refill(s) ; Ordering Provider:   Rhianna Bryant MD; Catalog Code:   divalproex sodium ; Order Dt/Tm:   1/5/2021 4:07:01 PM CST            ID Risk Screen   Recent Travel History :   No recent travel   Family Member Travel History :   No recent travel   Other Exposure to Infectious Disease :   Unknown   COVID-19 Testing Status :   No COVID-19 test performed   Helen Jacobs - 4/1/2021 10:36 AM CDT   Social History   Social History   (As Of: 4/1/2021 10:42:19 AM CDT)   Alcohol:        Never   (Last Updated: 3/13/2019 10:07:09 AM CDT by Melanie Mccormick)          Tobacco:        5-9 cigarettes (between 1/4 to 1/2 pack)/day in last 30 days, Cigarettes   (Last Updated: 1/5/2021 3:52:55 PM CST by Helen Jacobs)          Electronic Cigarette/Vaping:        Electronic Cigarette Use: Never.   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  Activity level: Moderate physical work.   (Last Updated: 12/19/2017 12:00:19 PM CST by Margie Kumar)          Home/Environment:        Marital status: Single.  Spouse/Partner name: shares custody with ex-partner.  Lives with Children, Significant other.  3 children.  Living situation: Home/Independent.    (Last Updated: 12/19/2017 12:02:47 PM CST by Margie Kumar)          Nutrition/Health:        Type of diet: Regular.   (Last Updated: 5/27/2015 4:00:50 PM CDT by Yadi Osborn LPN)          Sexual:        Sexually active: Yes.  Sexual orientation: Heterosexual.   (Last Updated: 5/27/2015 4:01:09 PM CDT by Yadi Osborn LPN)

## 2022-02-15 NOTE — TELEPHONE ENCOUNTER
---------------------  From: Rose Terry CMA   Sent: 8/14/2019 8:27:56 AM CDT  [facility]     Fax received from [employer] ([employer]) to complete and return regarding patients neurological condition. [facility] Please invite patient in to complete form with [facility].Patient returned call. Advised her of message, she was transferred to scheduling.

## 2022-02-15 NOTE — LETTER
(Inserted Image. Unable to display)     August 14, 2020      MATHEUS REARDON  [address]  [place], [place] [id number]          Dear MATHEUS,      Thank you for selecting [facility] (previously [facility]) for your healthcare needs.      Our records indicate you are due for the following services:     Follow-up office visit.      To schedule an appointment or if you have further questions, please contact your primary clinic:   [facility]       [phone]   [facility]       [phone]              [facility]     [phone]      Powered by [employer] and [employer]    Sincerely,    Rhianna Bryant MD

## 2022-02-15 NOTE — TELEPHONE ENCOUNTER
---------------------  From: Arely Kraft CMA (Phone Messages Pool ([id number]_[facility]))   To: Loren Ramirez; Genia Fortune;     Sent: 8/11/2020 9:25:28 AM CDT  Subject: employer letter     Hi ladies,    Pt has LM in  at [phone] in regards to letter on [employer] letterhead that was faxed on 8/5. She states her employer is claiming they have not received. Pt is frustrated since she is supposed to start work next week and cannot without letter. Please resend or call employer to discuss if there is a better fax #. Pt would like to be informed of outcome. Can call at [phone].    There is a consumer message from 8/4 with attachment from employer with phone number and fax.     Thank you!Faxed letter again to [phone] 8/11/20 @ 10:36am, and faxed it to [phone] 08/11/20 @ 9:28am. left message for patient that these were faxed to both numbers. Loren ZABALA

## 2022-02-15 NOTE — TELEPHONE ENCOUNTER
Entered by Serina Olmstead MA on January 31, 2021 11:09:21 AM CST  ---------------------  From: Serina Olmstead MA   To: [facility] #[id number]    Sent: 1/31/2021 11:09:21 AM CST  Subject: Medication Management     ** Submitted: **  Order:DULoxetine (DULoxetine 60 mg oral delayed release capsule)  1 cap(s)  Oral  daily  Qty:  30 cap(s)        Days Supply:  30        Refills:  0          Substitutions Allowed     Route To [facility] #[id number]    Signed by Serina Olmstead MA  1/31/2021 5:08:00 PM UT    ** Submitted: **  Complete:DULoxetine (DULoxetine 30 mg oral delayed release capsule)   Signed by Serina Olmstead MA  1/31/2021 5:09:00 PM UT    ** Submitted: **  Complete:DULoxetine (DULoxetine 60 mg oral delayed release capsule)   Signed by Serina Olmstead MA  1/31/2021 5:09:00 PM UT    ** Submitted: **  Complete:DULoxetine (DULoxetine 60 mg oral delayed release capsule)   Signed by Serina Olmstead MA  1/31/2021 5:09:00 PM UT    ** Not Approved:  **  DULoxetine (DULOXETINE DR 60MG CAPSULES)  TAKE 1 CAPSULE BY MOUTH DAILY. START AFTER COMPLETING THE 30 MG CAPSULES  Qty:  30 cap(s)        Days Supply:  30        Refills:  0          Substitutions Allowed     [id number]   Signed by Serina Olmstead MA            ------------------------------------------  From: [facility] #[id number]  To: Rhianna Bryant MD  Sent: January 30, 2021 7:10:16 PM CST  Subject: Medication Management  Due: January 30, 2021 4:23:19 PM CST     ** On Hold Pending Signature **     Dispensed Drug: DULoxetine (DULoxetine 60 mg oral delayed release capsule), TAKE 1 CAPSULE BY MOUTH DAILY. START AFTER COMPLETING THE 30 MG CAPSULES  Quantity: 30 cap(s)  Days Supply: 30  Refills: 0  Substitutions Allowed  Notes from Pharmacy:  ------------------------------------------Med Refill      Date of last office visit and reason:  1/5/2021 w/ SHRAVAN for mood f/u      Date of last Med Check / Px:    _  Date of last labs pertaining to med:  _    Note:  _    RTC order in chart:  RTC now due, reminder letters sent    For Protocol refill, has patient been contacted:  message sent to pharmacy

## 2022-02-15 NOTE — TELEPHONE ENCOUNTER
---------------------  From: Tracey Peraza CMA ([facility] Pool ([id number]_[facility]))   To: [facility] Message Pool ([id number]_[place] [facility] [place]);     Sent: 11/12/2019 2:46:15 PM CST  Subject: FW: Medication Management   Due Date/Time: 11/13/2019 10:59:00 AM CST     Medication Refill needing approval    PCP:   SHRAVAN    Medication:   Depakote  Last Filled:  10/10/19    Quantity:  90  Refills:  0  CSA on file?   no     Date of last office visit and reason:   8/20/19; office visit  Date of last labs pertaining to condition:  9/11/19    Return to Clinic order placed?  yes; overdue for px        ------------------------------------------  From: [facility] #[id number]  To: Rhianna Bryant MD  Sent: November 12, 2019 10:59:55 AM CST  Subject: Medication Management  Due: November 13, 2019 10:59:55 AM CST    ** On Hold Pending Signature **  Drug: divalproex sodium (Depakote  mg oral tablet, extended release)  3 TAB(S) ORAL DAILY  Quantity: 90 tab(s)  Days Supply: 0  Refills: 0  Substitutions Allowed  Notes from Pharmacy:     Dispensed Drug: divalproex sodium (divalproex sodium 250 mg oral tablet, extended release)  TAKE 3 TABLETS BY MOUTH DAILY  Quantity: 90 tab(s)  Days Supply: 30  Refills: 0  Substitutions Allowed  Notes from Pharmacy:   ---------------------------------------------------------------  From: Rose Terry CMA ([facility] Message Pool ([id number]_[facility]))   To: Rhianna Bryant MD;     Sent: 11/12/2019 2:49:30 PM CST  Subject: FW: Medication Management   Due Date/Time: 11/13/2019 10:59:00 AM CST     PAST DUE- PX. Please advise.

## 2022-02-15 NOTE — PROGRESS NOTES
Chief Complaint    Discuss medications, she is currently not taking any and she feels she should be  History of Present Illness      patient present to clinic today for follow up mood      stpped taking her depakote, duloxetine and quetiepine, had been on 500, 60 qday , and 12.5 qhs, now having a hard time sleeping      also see dayanara 7 and Phq 9, 21 and 22,      has been off of her meds a few months, sad, anxious, irritable, no problems iwth the law, no a/v hallucinations, irritable with her kids butalexandra is helping out and they are safe.       Review of systems is negative except as per HPI including:  no fevers, chills, sore throat, runny nose, nausea, vomiting, constipation, diarrhea, rash or new skin lesions, chest pain, palpitations, slurred speech, new paresthesia, shortness of breath or wheeze.  no SI or HI, no a/v hallucinations      Exam:      General: alert and oriented ×3 no acute distress.      HEENT: pupils are equal round and reactive to light extraocular motion is intact. Normocephalic and atraumatic.       Hearing is grossly normal and there is no otorrhea.       Nares are patent there is no rhinorrhea.       Mucous membranes are moist and pink.      Chest: has bilateral rise with no increased work of breathing.      Cardiovascular: normal perfusion and brisk capillary refill.      Musculoskeletal: no gross focal abnormalities and normal gait.      Neuro: no gross focal abnormalities and memory seems intact.      Psychiatric: speech is clear and coherent and fluent. Patient dressed appropriately for the weather. Mood is anxious and sad and affect is full. good insight, fair judgement, thought process logical, no psychomotor agitiation                     Discussed with patient to return to clinic if symptoms worsen or do not improve.  Physical Exam   Vitals & Measurements    T: 98.5  F (Tympanic)  HR: 76 (Peripheral)  BP: 98/64  SpO2: 99%     HT: 66 in  HT: 66 in  WT: 188.2 lb  BMI: 30.37    Assessment/Plan       Bipolar (F31.32)         Ordered:          DULoxetine, See Instructions, Instructions: 1 cap(s) Oral daily x 1 week then increase to 2 pills daily, # 60 EA, 0 Refill(s), Type: Maintenance, Pharmacy: [facility] #[id number], 1 cap(s) Oral daily x 1 week then increase to 2 pills daily, 66, in, 01/05/21..., (Ordered)          Return to Clinic (Request), Return in 2 weeks, telemed ok                Insomnia (G47.00)         Ordered:          QUEtiapine, 1/2 tab(s)-1 tab, Oral, qhs, # 30 EA, 5 Refill(s), Type: Maintenance, Pharmacy: [facility] #[id number], 1/2 tab(s)-1 tab Oral qhs, 66, in, 01/05/21 15:46:00 CST, Height Measured, 188.2, lb, 01/05/21 15:46:00 CST, Weight Measured, (Ordered)          Return to Clinic (Request), Return in 2 weeks, telemed ok                Orders:         divalproex sodium, = 1 tab(s), Oral, daily, Instructions: *NEED APPT FOR FURTHER REFILLS*, # 14 tab(s), 0 Refill(s), Type: Hard Stop, Pharmacy: [facility] #[id number], *NEED APPT FOR FURTHER REFILLS*, 66, in, 08/05/20 11:30:00 CDT, Height Measured, 174, lb, 05/14/2..., (Completed)         divalproex sodium, = 1 tab(s), Oral, daily, # 90 tab(s), 1 Refill(s), Type: Maintenance, Pharmacy: [facility] #[id number], *NEED APPT FOR FURTHER REFILLS*, 1 tab(s) Oral daily, 66, in, 01/05/21 15:46:00 CST, Height Measured, 188.2, lb, 01/05/21 15:46:00 CST, Weight..., (Ordered)         DULoxetine, = 1 cap(s) ( 60 mg ), Oral, daily, Instructions: start after completing the 30 mg capsules, # 30 cap(s), 0 Refill(s), Type: Maintenance, Pharmacy: [facility] #[id number], 1 cap(s) Oral daily,Instr:start after completing the 30 mg capsules, 66, in..., (Ordered)      will have pt start duloxetine 30 mg qday x 1 week then increase to 2 po qday, restart depakote at 500 and may take 1/2 to 1 tablet of the 25 mg quetiapine at qhs      contracts against suicide, rtc 2 weeks.  Patient Information     Name:MATHEUS REARDON  HELIO      Address:      [address] [place] [address] [address] [place] [phone]     Sex:Female     YOB: 1990     [phone]     Emergency Contact:STAR DRAKE     MRN:[id number]     FIN:[id number]     Location:[facility]     Date of Service:01/05/2021      Primary Care Physician:       Rhianna Bryant MD, [phone]      Attending Physician:       Rhianna Bryant MD, [phone]  Problem List/Past Medical History    Ongoing     Biceps tendonitis     Bipolar     Obesity     Subacromial impingement     Tobacco user    Historical     Kidney stone     Pregnancy     Pregnancy  Procedure/Surgical History     Arthroscopy of shoulder - Right (01/06/2020)            Comments: Arthroscopic tenotomy of the long head biceps tendon..     Subacromial decompression (02/25/2019)            Comments: Right. including anterior acromioplasty..     Tonsillectomy and adenoidectomy (04/23/2018)           Tubal sterilization (12/02/2011)           NVD (Normal Vaginal Delivery) (08/25/2011)            Comments: Female - 39w1d..     Extraction of wisdom tooth        Medications    divalproex sodium 500 mg oral tablet, extended release, 1 tab(s), Oral, daily, 1 refills    DULoxetine 30 mg oral delayed release capsule, See Instructions    DULoxetine 60 mg oral delayed release capsule, 60 mg= 1 cap(s), Oral, daily    DULoxetine 60 mg oral delayed release capsule, 60 mg= 1 cap(s), Oral, bid, 1 refills    QUEtiapine 25 mg oral tablet, 1/2 tab(s)-1 tab, Oral, qhs, 5 refills  Allergies    Biaxin    Keflex  Social History    Smoking Status     Current every day smoker     Alcohol      Never     Electronic Cigarette/Vaping      Electronic Cigarette Use: Never.     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 Activity level: Moderate physical work.     Home/Environment      Marital status: Single. Spouse/Partner  name: shares custody with ex-partner. Lives with Children, Significant other. 3 children. Living situation: Home/Independent.     Nutrition/Health      Type of diet: Regular.     Sexual      Sexually active: Yes. Sexual orientation: Heterosexual.     Tobacco      5-9 cigarettes (between 1/4 to 1/2 pack)/day in last 30 days, Cigarettes  Family History    Diabetes mellitus: Grandmother (M).    Heart disease: Grandmother (M).  Immunizations      Vaccine Date Status          influenza virus vaccine, inactivated 12/19/2019 Given          tetanus/diphth/pertuss (Tdap) adult/adol 08/26/2011 Recorded              Comments : [facility]          influenza virus vaccine, inactivated 09/17/2009 Recorded          Td 07/26/2008 Recorded          rubella 09/13/2007 Recorded          Td 06/10/2004 Recorded          Hep B 11/10/1999 Recorded          Hep B 10/21/1998 Recorded          Hep B 06/17/1998 Recorded          varicella 05/08/1998 Recorded          Hep B 05/08/1998 Recorded          OPV 08/19/1993 Recorded          MMR (measles/mumps/rubella) 08/19/1993 Recorded          DTaP 08/19/1993 Recorded          Hib 04/01/1993 Recorded          Hib (HbOC) 04/01/1993 Recorded          OPV 04/23/1991 Recorded          MMR (measles/mumps/rubella) 04/23/1991 Recorded          DTaP 04/23/1991 Recorded          OPV 03/01/1989 Recorded          DTaP 03/01/1989 Recorded          OPV 1988 Recorded          DTaP 1988 Recorded          OPV 1988 Recorded          DTaP 1988 Recorded

## 2022-02-15 NOTE — PROGRESS NOTES
Chief Complaint    Follow up mood  History of Present Illness      patient present to clinic today for follow up of mood  dayanara 7 and PHQ 9 both improved.  tolerates the depakote xr 250 1 po bid, did not tolerate taking 3 poqday, has had significant improvement in mood and is less irritable with the depakote.    feels a  little blunted, does not want to increase the depakote, but would like to add an antidepressent, does get tension headaches almost daily      also has some insect bites that get very inflammed and itchy, uses po and topical benadryl, started before starting the depakote,      Review of systems is negative except as per HPI including:  no fevers, chills, sore throat, runny nose, nausea, vomiting, constipation, diarrhea, rash or new skin lesions, chest pain, palpitations, slurred speech, new paresthesia, shortness of breath or wheeze.      Exam:      General: alert and oriented ×3 no acute distress.      HEENT: pupils are equal round and reactive to light extraocular motion is intact. Normocephalic and atraumatic.       Hearing is grossly normal and there is no otorrhea.       Nares are patent there is no rhinorrhea.       Mucous membranes are moist and pink.      Chest: has bilateral rise with no increased work of breathing.      Cardiovascular: normal perfusion and brisk capillary refill.      Musculoskeletal: no gross focal abnormalities and normal gait.      Neuro: no gross focal abnormalities and memory seems intact.      Psychiatric: speech is clear and coherent and fluent. Patient dressed appropriately for the weather. Mood is appropriate and affect is full.      skin: bilaterl lower ext have red patched about 4 cm diameter no fluctuance, hive-like appearance             Discussed with patient to return to clinic if symptoms worsen or do not improve  Physical Exam   Vitals & Measurements    T: 98.1   F (Tympanic)  HR: 56(Peripheral)  BP: 90/52  SpO2: 99%     HT: 66 in  WT: 154.2 lb    Assessment/Plan       Bipolar (F31.9)         Orders:          DULoxetine, See Instructions, Instructions: 1 cap(s) Oral qday x 1 week then increase to 2 PO qday, # 60 EA, 1 Refill(s), Type: Maintenance, Pharmacy: [id number], 1 cap(s) Oral qday x 1 week then increase to 2 PO qday, (Ordered)          [id number] office outpatient visit 25 minutes (Charge), Quantity: 1, Bite, insect  Bipolar          Return to Clinic (Request), Return in 2 weeks                Bite, insect (W57.XXXA)        referred pt to [facility] website info on avoiding insect bites,         Orders:          lidocaine topical, 1 jac, TOP, TID, PRN: for itching, # 35 gm, 1 Refill(s), Type: Maintenance, Pharmacy: [facility] [id number], 1 jac Topical tid,x7 day(s),PRN:for itching, (Ordered)          triamcinolone topical, 1 jac, TOP, TID, PRN: for rash, # 30 gm, 1 Refill(s), Type: Maintenance, Pharmacy: [facility] [id number], 1 jac Topical tid,x7 day(s),PRN:for rash, (Ordered)          [id number] office outpatient visit 25 minutes (Charge), Quantity: 1, Bite, insect  Bipolar          Return to Clinic (Request), Return in 2 weeks               25 minutes spent with patient in direct face to face contact, > 50% of time spent counseling and coordinating care.   Patient Information     Name:MATHEUS REARDON      Address:      [address] [place] [id number]     Sex:Female     YOB: 1990     [phone]     Emergency Contact:[facility], CONTACT     MRN:[id number]     FIN:[id number]     Location:[facility]     Date of Service:08/08/2018      Primary Care Physician:       Selam Mcghee MD, [phone]      Attending Physician:       Rhianna Bryant MD, [phone]  Problem List/Past Medical History    Ongoing     Bipolar    Historical     Kidney stone     Pregnancy     Pregnancy  Procedure/Surgical History     Tubal sterilization (12/02/2011)           NVD (Normal  Vaginal Delivery) (08/25/2011)            Comments:      Female - 39w1d.     Extraction of wisdom tooth        Medications     ibuprofen 800 mg oral tablet: 800 mg, 1 tab(s), po, prn, 0 Refill(s).     Depakote  mg oral tablet, extended release: See Instructions, 1 tab(s) Oral daily x 3 days then increase to 2 po Qday x 3 days, then increase to 3 po qday, 90 EA, 1 Refill(s).     DULoxetine 30 mg oral delayed release capsule: See Instructions, 1 cap(s) Oral qday x 1 week then increase to 2 PO qday, 60 EA, 1 Refill(s).     triamcinolone 0.1% topical ointment: 1 jac, TOP, TID, for 7 day(s), PRN: for rash, 30 gm, 1 Refill(s).     lidocaine 5% topical ointment: 1 jac, TOP, TID, for 7 day(s), PRN: for itching, 35 gm, 1 Refill(s).          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 Activity level: Moderate physical work., 12/19/2017     Home and Environment      Marital status: Single. Spouse/Partner name: shares custody with ex-partner. Lives with Children, Significant other. 3 children. Living situation: Home/Independent., 12/19/2017     Nutrition and Health      Type of diet: Regular., 05/27/2015     Sexual      Sexually active: Yes. Sexual orientation: Heterosexual., 05/27/2015     Tobacco      Former smoker, quit more than 30 days ago, 02/26/2018  Family History    Diabetes mellitus: Grandmother (M).    Heart disease: Grandmother (M).  Immunizations      Vaccine Date Status Comments      tetanus/diphth/pertuss (Tdap) adult/adol 08/26/2011 Recorded [facility]      influenza virus vaccine, inactivated 09/17/2009 Recorded      Td 07/26/2008 Recorded      rubella 09/13/2007 Recorded      Td 06/10/2004 Recorded      Hep B 11/10/1999 Recorded      Hep B 10/21/1998 Recorded       Hep B 06/17/1998 Recorded      Hep B 05/08/1998 Recorded      varicella 05/08/1998 Recorded      MMR (measles/mumps/rubella) 08/19/1993 Recorded      OPV 08/19/1993 Recorded      DTaP 08/19/1993 Recorded      Hib 04/01/1993 Recorded      MMR (measles/mumps/rubella) 04/23/1991 Recorded      OPV 04/23/1991 Recorded      DTaP 04/23/1991 Recorded      OPV 03/01/1989 Recorded      DTaP 03/01/1989 Recorded      OPV 1988 Recorded      DTaP 1988 Recorded      OPV 1988 Recorded      DTaP 1988 Recorded

## 2022-02-15 NOTE — NURSING NOTE
Comprehensive Intake Entered On:  5/6/2020 3:54 PM CDT    Performed On:  5/6/2020 3:50 PM CDT by Rose Walsh CMA   Chief Complaint :   Med check. feeling worthless. Verbal consent given for telephone visit.    Menstrual Status :   Menarcheal   Nico Rose SAHU - 5/6/2020 3:50 PM CDT   Health Status   Allergies Verified? :   Yes   Medication History Verified? :   Yes   Immunizations Current :   Yes   Pre-Visit Planning Status :   N/A   AniOralia Rose SAHU - 5/6/2020 3:50 PM CDT   Consents   Consent for Immunization Exchange :   Consent Granted   Consent for Immunizations to Providers :   Consent Granted   MarcosdellOralia Rose SAHU - 5/6/2020 3:50 PM CDT   Meds / Allergies   (As Of: 5/6/2020 3:54:11 PM CDT)   Allergies (Active)   Biaxin  Estimated Onset Date:   Unspecified ; Created By:   Yadi Osborn LPN; Reaction Status:   Active ; Substance:   Biaxin ; Updated By:   Yadi Osborn LPN; Reviewed Date:   2/21/2020 10:57 AM CST      Keflex  Estimated Onset Date:   Unspecified ; Created By:   Yadi Osborn LPN; Reaction Status:   Active ; Category:   Drug ; Substance:   Keflex ; Type:   Allergy ; Updated By:   Yadi Osborn LPN; Reviewed Date:   2/21/2020 10:57 AM CST        Medication List   (As Of: 5/6/2020 3:54:11 PM CDT)   Prescription/Discharge Order    acetaminophen-oxycodone  :   acetaminophen-oxycodone ; Status:   Prescribed ; Ordered As Mnemonic:   Percocet 5/325 oral tablet ; Simple Display Line:   1-2 tab(s), po, q6 hrs, PRN: as needed for pain, 20 tab(s), 0 Refill(s) ; Ordering Provider:   Rhianna Bryant MD; Catalog Code:   acetaminophen-oxycodone ; Order Dt/Tm:   12/19/2019 10:35:21 AM CST          divalproex sodium  :   divalproex sodium ; Status:   Prescribed ; Ordered As Mnemonic:   divalproex sodium 250 mg oral tablet, extended release ; Simple Display Line:   3 tab(s), Oral, daily, 270 tab(s), 1 Refill(s) ; Ordering Provider:   Rhianna Bryant MD; Catalog Code:    divalproex sodium ; Order Dt/Tm:   12/20/2019 4:31:56 PM CST          DULoxetine  :   DULoxetine ; Status:   Prescribed ; Ordered As Mnemonic:   DULoxetine 60 mg oral delayed release capsule ; Simple Display Line:   1 cap(s), Oral, daily, 90 cap(s), 1 Refill(s) ; Ordering Provider:   Rhianna Bryant MD; Catalog Code:   DULoxetine ; Order Dt/Tm:   12/20/2019 4:32:28 PM CST          gabapentin  :   gabapentin ; Status:   Prescribed ; Ordered As Mnemonic:   gabapentin 100 mg oral capsule ; Simple Display Line:   100 mg, 1 cap(s), Oral, tid, 90 cap(s), 0 Refill(s) ; Ordering Provider:   Oc Adhikari MD; Catalog Code:   gabapentin ; Order Dt/Tm:   2/21/2020 10:45:27 AM CST            ID Risk Screen   Recent Travel History :   No recent travel   Family Member Travel History :   No recent travel   Other Exposure to Infectious Disease :   Unknown   Rose Walsh CMA - 5/6/2020 3:50 PM CDT

## 2022-02-15 NOTE — NURSING NOTE
Comprehensive Intake Entered On:  7/23/2020 8:58 AM CDT    Performed On:  7/23/2020 8:57 AM CDT by Rose Walsh CMA   Chief Complaint :   Med check. Verbal consent given for video visit.    Menstrual Status :   Menarcheal   Height Measured :   66 in(Converted to: 5 ft 6 in, 167.64 cm)    Height/Length Estimated :   66 in(Converted to: 5 ft 6 in, 167.64 cm)    Rose Walsh CMA - 7/23/2020 8:57 AM CDT   Health Status   Allergies Verified? :   Yes   Medication History Verified? :   Yes   Immunizations Current :   Yes   Pre-Visit Planning Status :   N/A   Tobacco Use? :   Former smoker   Rose Walsh CMA - 7/23/2020 8:57 AM CDT   Consents   Consent for Immunization Exchange :   Consent Granted   Consent for Immunizations to Providers :   Consent Granted   Rose Walsh CMA - 7/23/2020 8:57 AM CDT   Meds / Allergies   (As Of: 7/23/2020 8:58:06 AM CDT)   Allergies (Active)   Biaxin  Estimated Onset Date:   Unspecified ; Created By:   Yadi Osborn LPN; Reaction Status:   Active ; Substance:   Biaxin ; Updated By:   Yadi Osborn LPN; Reviewed Date:   2/21/2020 10:57 AM CST      Keflex  Estimated Onset Date:   Unspecified ; Created By:   Yadi Osborn LPN; Reaction Status:   Active ; Category:   Drug ; Substance:   Keflex ; Type:   Allergy ; Updated By:   Yadi Osborn LPN; Reviewed Date:   2/21/2020 10:57 AM CST        Medication List   (As Of: 7/23/2020 8:58:06 AM CDT)   Prescription/Discharge Order    acetaminophen-oxycodone  :   acetaminophen-oxycodone ; Status:   Prescribed ; Ordered As Mnemonic:   Percocet 5/325 oral tablet ; Simple Display Line:   1-2 tab(s), po, q6 hrs, PRN: as needed for pain, 20 tab(s), 0 Refill(s) ; Ordering Provider:   Rhianna Bryant MD; Catalog Code:   acetaminophen-oxycodone ; Order Dt/Tm:   12/19/2019 10:35:21 AM CST          divalproex sodium  :   divalproex sodium ; Status:   Prescribed ; Ordered As Mnemonic:   divalproex sodium 250 mg oral tablet,  extended release ; Simple Display Line:   3 tab(s), Oral, daily, 270 tab(s), 1 Refill(s) ; Ordering Provider:   Rhianna Bryant MD; Catalog Code:   divalproex sodium ; Order Dt/Tm:   12/20/2019 4:31:56 PM CST          DULoxetine  :   DULoxetine ; Status:   Prescribed ; Ordered As Mnemonic:   DULoxetine 60 mg oral delayed release capsule ; Simple Display Line:   60 mg, 1 cap(s), Oral, bid, 180 cap(s), 1 Refill(s) ; Ordering Provider:   Rhianna Bryant MD; Catalog Code:   DULoxetine ; Order Dt/Tm:   5/6/2020 4:15:13 PM CDT          gabapentin  :   gabapentin ; Status:   Processing ; Ordered As Mnemonic:   gabapentin 100 mg oral capsule ; Ordering Provider:   Zeynep ANGEL Oc; Action Display:   Complete ; Catalog Code:   gabapentin ; Order Dt/Tm:   7/23/2020 8:57:52 AM CDT            ID Risk Screen   Recent Travel History :   No recent travel   Family Member Travel History :   No recent travel   Other Exposure to Infectious Disease :   Unknown   Rose Walsh CMA - 7/23/2020 8:57 AM CDT

## 2022-02-15 NOTE — TELEPHONE ENCOUNTER
---------------------  From: Luz/Rosana PAIGE (Phone Messages Pool ([address]224_[facility]))   To: Oc Adhikari MD;     Sent: 8/4/2020 10:34:03 AM CDT  Subject: Work Letter     Phone Message    PCP: SHRAVAN    Time of Call: 1000    Phone Number: [phone] LM    Returned call at: 1028    Note: Patient called stating that her new employer is needing a note from SHRAVAN stating that she has been on her medications for a couple of years.    Called pt at 1028, patient stated that she had sent a message through the protal with what her employer wants (attached is the letter from employer). Patient stated that she cannot wait until [facility] comes back because she is starting work 8/9/20. Patient states that when the letter is wrote, there is a fax number on the attached file that has a fax number on it.

## 2022-02-15 NOTE — TELEPHONE ENCOUNTER
Entered by Kristina Liang CMA on December 20, 2019 2:22:45 PM CST  ---------------------  From: Kristina Liang CMA   To: [employer] #[id number]    Sent: 12/20/2019 2:22:45 PM CST  Subject: Medication Management     ** Submitted: **  Order:divalproex sodium (divalproex sodium 250 mg oral tablet, extended release)  3 tab(s)  Oral  daily  Qty:  90 tab(s)        Days Supply:  30        Refills:  0          Substitutions Allowed     [id number]    Signed by Kristina Liang CMA  12/20/2019 2:22:00 PM    ** Submitted: **  Complete:divalproex sodium (divalproex sodium 250 mg oral tablet, extended release)   Signed by Kristina Liang CMA  12/20/2019 2:22:00 PM    ** Submitted: **  Complete:divalproex sodium (divalproex sodium 250 mg oral tablet, extended release)   Signed by Kristina Liang CMA  12/20/2019 2:22:00 PM    ** Not Approved:  **  divalproex sodium (DIVALPROEX EXTENDED RELEASE 250MG T)  TAKE 3 TABLETS BY MOUTH DAILY  Qty:  90 tab(s)        Days Supply:  30        Refills:  0          Substitutions Allowed     [id number]   Signed by Kristina Liang CMA            ------------------------------------------  From: [facility] [facility] [id number]  To: Rhianna Bryant MD  Sent: December 19, 2019 5:32:44 PM CST  Subject: Medication Management  Due: December 20, 2019 5:32:44 PM CST    ** On Hold Pending Signature **  Drug: divalproex sodium (divalproex sodium 250 mg oral tablet, extended release)  TAKE 3 TABLETS BY MOUTH DAILY  Quantity: 90 tab(s)  Days Supply: 30  Refills: 0  Substitutions Allowed  Notes from Pharmacy:     Dispensed Drug: divalproex sodium (divalproex sodium 250 mg oral tablet, extended release)  TAKE 3 TABLETS BY MOUTH DAILY  Quantity: 90 tab(s)  Days Supply: 30  Refills: 0  Substitutions Allowed  Notes from Pharmacy:   ------------------------------------------Sent protocol refill. Called patient she said she had a med  check and preop yesterday, but unsure when to follow up. Please advise.---------------------  From: Kristina Liang CMA (Shalom Winters ([address]_[facility]))   To: Rhianna Bryant MD;     Sent: 12/20/2019 2:26:38 PM CST  Subject: FW: Medication Management   Due Date/Time: 12/20/2019 5:32:00 PM CST

## 2022-02-15 NOTE — TELEPHONE ENCOUNTER
Entered by Tracey Peraza CMA on March 01, 2021 7:37:06 AM CST  ---------------------  From: Tracey Peraza CMA   To: [facility] #[id number]    Sent: 3/1/2021 7:37:06 AM CST  Subject: Medication Management     ** Submitted: **  Order:DULoxetine (DULoxetine 60 mg oral delayed release capsule)  1 cap(s)  Oral  daily  Qty:  30 cap(s)        Days Supply:  30        Refills:  0          Substitutions Allowed     Route To Pharmacy - [facility] #[id number]    Signed by Tracey Peraza CMA  3/1/2021 1:36:00 PM [facility]    ** Submitted: **  Complete:DULoxetine (DULoxetine 60 mg oral delayed release capsule)   Signed by Tracey Peraza CMA  3/1/2021 1:37:00 PM [facility]    ** Not Approved:  **  DULoxetine (DULOXETINE DR 60MG CAPSULES)  TAKE 1 CAPSULE BY MOUTH DAILY  Qty:  30 cap(s)        Days Supply:  30        Refills:  0          Substitutions Allowed     Route To Pharmacy - [facility] #[id number]   Signed by Tracey Peraza CMA            ------------------------------------------  From: [facility] #[id number]  To: Rhianna Bryant MD  Sent: February 26, 2021 8:57:37 AM CST  Subject: Medication Management  Due: February 19, 2021 9:56:32 PM CST     ** On Hold Pending Signature **     Dispensed Drug: DULoxetine (DULoxetine 60 mg oral delayed release capsule), TAKE 1 CAPSULE BY MOUTH DAILY  Quantity: 30 cap(s)  Days Supply: 30  Refills: 0  Substitutions Allowed  Notes from Pharmacy:  ------------------------------------------overdue for f/u - msg sent to [facility]

## 2022-02-15 NOTE — TELEPHONE ENCOUNTER
---------------------  From: Eboni Nicholson LPN MICKEY (eRx Pool ([address]_[facility]))   To: Rhianna Bryant MD;     Sent: 6/24/2019 6:44:21 PM CDT  Subject: FW: Medication Management   Due Date/Time: 6/24/2019 8:40:00 PM CDT     Please advise on when pt is due to return for a visit. No RTC placed.  Last fill 3/8/19 #90 with 0 refills        ------------------------------------------  From: [employer] [id number]  To: Rhianna Bryant MD  Sent: June 23, 2019 8:40:07 PM CDT  Subject: Medication Management  Due: June 24, 2019 8:40:07 PM CDT    ** On Hold Pending Signature **  Drug: DULoxetine (DULoxetine 60 mg oral delayed release capsule)  1 CAP(S) ORAL DAILY  Quantity: 90 cap(s)     Days Supply: 0         Refills: 0  Substitutions Allowed  Notes from Pharmacy:     Dispensed Drug: DULoxetine (DULoxetine 60 mg oral delayed release capsule)  TAKE 1 CAPSULE BY MOUTH DAILY  Quantity: 90 cap(s)     Days Supply: 90        Refills: 0  Substitutions Allowed  Notes from Pharmacy:   ---------------------------------------------------------------  From: Rhianna Bryant MD   To: [employer] [id number]    Sent: 6/24/2019 7:51:32 PM CDT  Subject: FW: Medication Management     ** Submitted: **  Complete:DULoxetine (DULoxetine 60 mg oral delayed release capsule)   Signed by Rhianna Bryant MD  6/24/2019 7:51:00 PM    ** Approved **  DULoxetine (DULOXETINE DR 60MG CAPSULES)  TAKE 1 CAPSULE BY MOUTH DAILY  Qty:  90 cap(s)        Days Supply:  90        Refills:  0          Substitutions Allowed     Route To Pharmacy - [facility] [id number]

## 2022-02-15 NOTE — TELEPHONE ENCOUNTER
Entered by Rhianna Bryant MD on March 08, 2019 10:01:00 AM CST  ---------------------  From: Rhianna Bryant MD   To: [employer] [id number]    Sent: 3/8/2019 10:01:00 AM CST  Subject: Medication Management     ** Submitted: **  Complete:divalproex sodium (Depakote  mg oral tablet, extended release)   Signed by Rhianna Bryant MD  3/8/2019 10:01:00 AM    ** Approved **  divalproex sodium (DIVALPROEX EXTENDED RELEASE 500MG T)  TAKE 1 TABLET BY MOUTH DAILY  Qty:  90 tab(s)        Days Supply:  90        Refills:  0          Substitutions Allowed     Route To [facility] [employer] [facility] [id number]               ** Submitted: **  Complete:DULoxetine (DULoxetine 60 mg oral delayed release capsule)   Signed by Rhianna Bryant MD  3/8/2019 10:00:00 AM    ** Approved **  DULoxetine (DULOXETINE DR 60MG CAPSULES)  TAKE 1 CAPSULE BY MOUTH DAILY  Qty:  90 cap(s)        Days Supply:  90        Refills:  0          Substitutions Allowed     Route To [facility] [place] [address]               ** Not Approved: Request responded to by other means **  divalproex sodium (DIVALPROEX EXTENDED RELEASE 500MG T)  TAKE 1 TABLET BY MOUTH DAILY  Qty:  90 tab(s)        Days Supply:  90        Refills:  0          Substitutions Allowed     [facility] [address]               ------------------------------------------  From: [employer] [id number]  To: Rhianna Bryant MD  Sent: March 7, 2019 9:22:35 AM CST  Subject: Medication Management  Due: March 8, 2019 9:22:35 AM CST    ** On Hold Pending Signature **  Drug: DULoxetine (DULoxetine 60 mg oral delayed release capsule)  1 CAP(S) ORAL DAILY  Quantity: 90 cap(s)     Days Supply: 0         Refills: 0  Substitutions Allowed  Notes from Pharmacy:     Dispensed Drug: DULoxetine (DULoxetine 60 mg oral delayed release capsule)  TAKE 1 CAPSULE BY MOUTH DAILY  Quantity: 90 cap(s)     Days Supply: 90        Refills: 0  Substitutions Allowed  Notes  from Pharmacy:     ** On Hold Pending Signature **  Drug: divalproex sodium (Depakote  mg oral tablet, extended release)  1 TAB(S) ORAL DAILY  Quantity: 90 tab(s)     Days Supply: 0         Refills: 0  Substitutions Allowed  Notes from Pharmacy:     Dispensed Drug: divalproex sodium (divalproex sodium 500 mg oral tablet, extended release)  TAKE 1 TABLET BY MOUTH DAILY  Quantity: 90 tab(s)     Days Supply: 90        Refills: 0  Substitutions Allowed  Notes from Pharmacy:     ** On Hold Pending Signature **  Drug: divalproex sodium (Depakote  mg oral tablet, extended release)  1 TAB(S) ORAL DAILY  Quantity: 90 tab(s)     Days Supply: 0         Refills: 0  Substitutions Allowed  Notes from Pharmacy:     Dispensed Drug: divalproex sodium (divalproex sodium 500 mg oral tablet, extended release)  TAKE 1 TABLET BY MOUTH DAILY  Quantity: 90 tab(s)     Days Supply: 90        Refills: 0  Substitutions Allowed  Notes from Pharmacy:   ------------------------------------------

## 2022-02-15 NOTE — NURSING NOTE
Comprehensive Intake Entered On:  8/5/2020 11:31 AM CDT    Performed On:  8/5/2020 11:30 AM CDT by Vianney Bonilla CMA               Summary   Chief Complaint :   verbal consent for video visit to discuss sinus pain   Menstrual Status :   Menarcheal   Height Measured :   66 in(Converted to: 5 ft 6 in, 167.64 cm)    Height/Length Estimated :   66 in(Converted to: 5 ft 6 in, 167.64 cm)    Vianney Bonilla CMA - 8/5/2020 11:30 AM CDT   Health Status   Allergies Verified? :   Yes   Medication History Verified? :   Yes   Immunizations Current :   Yes   Medical History Verified? :   Yes   Tobacco Use? :   Former smoker   Vianney Bonilla CMA - 8/5/2020 11:30 AM CDT   Consents   Consent for Immunization Exchange :   Consent Granted   Consent for Immunizations to Providers :   Consent Granted   Vianney Bonilla CMA - 8/5/2020 11:30 AM CDT   Meds / Allergies   (As Of: 8/5/2020 11:31:08 AM CDT)   Allergies (Active)   Biaxin  Estimated Onset Date:   Unspecified ; Created By:   Yadi Osborn LPN; Reaction Status:   Active ; Substance:   Biaxin ; Updated By:   Yadi Osborn LPN; Reviewed Date:   8/5/2020 11:31 AM CDT      Keflex  Estimated Onset Date:   Unspecified ; Created By:   Yadi Osborn LPN; Reaction Status:   Active ; Category:   Drug ; Substance:   Keflex ; Type:   Allergy ; Updated By:   Yadi Osborn LPN; Reviewed Date:   8/5/2020 11:31 AM CDT        Medication List   (As Of: 8/5/2020 11:31:08 AM CDT)   Prescription/Discharge Order    acetaminophen-oxycodone  :   acetaminophen-oxycodone ; Status:   Processing ; Ordered As Mnemonic:   Percocet 5/325 oral tablet ; Ordering Provider:   Rhianna Bryant MD; Action Display:   Complete ; Catalog Code:   acetaminophen-oxycodone ; Order Dt/Tm:   8/5/2020 11:30:49 AM CDT          divalproex sodium  :   divalproex sodium ; Status:   Prescribed ; Ordered As Mnemonic:   Depakote  mg oral tablet, extended release ; Simple Display Line:   500 mg, 1 tab(s), Oral,  daily, 30 tab(s), 0 Refill(s) ; Ordering Provider:   Rhianna Bryant MD; Catalog Code:   divalproex sodium ; Order Dt/Tm:   7/23/2020 9:22:01 AM CDT          DULoxetine  :   DULoxetine ; Status:   Prescribed ; Ordered As Mnemonic:   DULoxetine 60 mg oral delayed release capsule ; Simple Display Line:   60 mg, 1 cap(s), Oral, bid, 180 cap(s), 1 Refill(s) ; Ordering Provider:   Rhianna Bryant MD; Catalog Code:   DULoxetine ; Order Dt/Tm:   5/6/2020 4:15:13 PM CDT            ID Risk Screen   Recent Travel History :   No recent travel   Family Member Travel History :   No recent travel   Other Exposure to Infectious Disease :   Unknown   Vianney Bonilla CMA - 8/5/2020 11:30 AM CDT

## 2022-02-15 NOTE — PROGRESS NOTES
Chief Complaint    patient here today to fill out paperwork. Adverse reactions to mosquito bites.  History of Present Illness      rash started over the weekend.  She spent some time outdoors and did use her mosquito repellent but for some reason those mosquitoes really like to eat her.  And unfortunately each time they do they leave a huge itchy red welts that causes a lot of swelling.  She is tried using the topical lidocaine as well as the triamcinolone and some oral antihistamines without relief.  She has them scattered from her face and her arms and her chest and her legs all the way down to her feet.  Her right ankle has a nice cluster of them that is made her entire ankle swell up.  She is itching profusely while in clinic.  She is unable to sleep because of how itchy and severe the symptoms are.  She is hoping that we can try something else to help get the symptoms under control.  In addition to using her mosquito repellent she uses long sleeves and also wears long socks.      tried some over the counter creams without relief      no recent trips, no changes in laundry products or cleaning products,   no  new pets,   no contacts with people with similar condition,  no new lotions or creams,   no recent camping trips      it itches      it is not spreading locally and/or  generalized      no fevers, chills, sore throat, runny nose, nausea, vomiting, constipation, no new skin lesions, chest pain, palpitations, slurred speech, new paresthesia, shortness of breath or wheeze.      She also applied for some life insurance at her workplace and they have sent over form requesting information regarding her chronic neurological condition.  I was confused by this paperwork because I did not know her to have any type of a chronic neurological condition.  She does have a history of bipolar disorder which I would not consider a neurological disorder but rather her mental health disorder.  I asked if she had significant  paresthesias in her arm prior to her surgery and she reports no.  She has no history of multiple sclerosis or any other chronic neurological disorder no history of a seizure disorder.  I did complete the form for her and it should be scanned into her chart.  Basically we are saying that she has no known chronic neurological disorders.      Review of systems is negative except as per HPI      Exam:      General: alert and oriented ×3 no acute distress.      HEENT: pupils are equal round and reactive to light extraocular motion is intact. Normocephalic and atraumatic.       Hearing is grossly normal and there is no otorrhea.       Nares are patent there is no rhinorrhea.       Mucous membranes are moist and pink.      Chest: has bilateral rise with no increased work of breathing.      Cardiovascular: normal perfusion and brisk capillary refill.      Musculoskeletal: no gross focal abnormalities and normal gait.      Neuro: no gross focal abnormalities and memory seems intact.      Psychiatric: speech is clear and coherent and fluent. Patient dressed appropriately for the weather. Mood is appropriate and affect is full.      Skin:      There are numerous lesions consistent with mosquito bites from a dozen or so on her face several on her neck bilateral arms chest legs and ankles.  Her right ankle is a little bit swollen.      Education:  discussed with patient diagnosis.    also see assessment and plan below.   Patient should return to clinic if symptoms worsen or do not improve, and as needed for next annual exam.  Also suggested she look at the [employer] website for additional tips on avoiding mosquito bites.  She had with her that there is a medicine called permethrin that she can use to treat her clothing oral she can buy some pretreated clothing that has mosquito repellent in it.  Physical Exam   Vitals & Measurements    T: 98.5   F (Tympanic)  HR: 94(Peripheral)  BP: 116/60  SpO2: 99%     WT: 166.8 lb    Assessment/Plan       Bug bite (W57.XXXA)        Between completing the forms and searching for some type of neurological disorder that the insurance company was looking for and then just reviewing how to care for these bug bites total of 25 minutes was spent with patient in direct face-to-face contact of which greater than 50% of time spent counseling patient coordinating care.        Of note patient had been having chronic pain in her right shoulder and is now doing great following her surgery.         Ordered:          25493 office outpatient visit 25 minutes (Charge), Quantity: 1, Bug bite                Orders:         predniSONE, = 1 tab(s) ( 20 mg ), Oral, bid, x 5 day(s), # 10 tab(s), 0 Refill(s), Type: Acute, Pharmacy: [facility] #[id number], 1 tab(s) Oral bid,x5 day(s), (Ordered)  Patient Information     Name:MATHEUS REARDON      Address:      [address] [place] [id number]     Sex:Female     YOB: 1990     [phone]     Emergency Contact:[facility], CONTACT     MRN:[id number]     FIN:[id number]     Location:[facility]     Date of Service:08/20/2019      Primary Care Physician:       Rhianna Bryant MD, [phone]      Attending Physician:       Rhianna Bryant MD, [phone]  Problem List/Past Medical History    Ongoing     Biceps tendonitis     Bipolar     Subacromial impingement    Historical     Kidney stone     Pregnancy     Pregnancy  Procedure/Surgical History     Subacromial decompression (02/25/2019)            Comments: Right. including anterior acromioplasty..     Tonsillectomy and adenoidectomy (04/23/2018)           Tubal sterilization (12/02/2011)           NVD (Normal Vaginal Delivery) (08/25/2011)            Comments: Female - 39w1d..     Extraction of wisdom tooth        Medications    Depakote  mg oral tablet, extended release, 750 mg= 3 tab(s), Oral, daily, 1 refills    divalproex sodium 500 mg  oral tablet, extended release, 1 tab(s), Oral, daily,   Not taking    DULoxetine 60 mg oral delayed release capsule, 1 cap(s), Oral, daily    lidocaine 5% topical ointment, 1 jac, Topical, tid, PRN, 1 refills,   Not taking    meloxicam 15 mg oral tablet, 15 mg= 1 tab(s), Oral, daily, PRN, 3 refills,   Not taking    Percocet 10/325 oral tablet, 1 tab(s), Oral, bid, PRN,   Not taking    Percocet 5/325 oral tablet, 1-2 tab(s), Oral, q6 hrs, PRN,   Not taking    predniSONE 20 mg oral tablet, 20 mg= 1 tab(s), Oral, bid    QUEtiapine 25 mg oral tablet, 1/2 tab(s), Oral, qhs, 3 refills,   Not taking    triamcinolone 0.1% topical ointment, 1 jac, Topical, tid, PRN, 1 refills,   Not taking    Voltaren 1% topical gel, 2 gm, Topical, qid, PRN, 1 refills,   Not taking    zolpidem 5 mg oral tablet, 5 mg= 1 tab(s), Oral, hs, PRN,   Not taking  Allergies    Biaxin    Keflex  Social History    Smoking Status - 08/20/2019     Light tobacco smoker     Alcohol      Never, 03/13/2019     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 Activity level: Moderate physical work., 12/19/2017     Home/Environment      Marital status: Single. Spouse/Partner name: shares custody with ex-partner. Lives with Children, Significant other. 3 children. Living situation: Home/Independent., 12/19/2017     Nutrition/Health      Type of diet: Regular., 05/27/2015     Sexual      Sexually active: Yes. Sexual orientation: Heterosexual., 05/27/2015     Tobacco      Former smoker, quit more than 30 days ago, 02/26/2018  Family History    Diabetes mellitus: Grandmother (M).    Heart disease: Grandmother (M).  Immunizations      Vaccine Date Status Comments      tetanus/diphth/pertuss (Tdap) adult/adol 08/26/2011 Recorded [facility]      influenza virus vaccine, inactivated 09/17/2009 Recorded      Td 07/26/2008 Recorded      rubella 09/13/2007 Recorded      Td 06/10/2004  Recorded      Hep B 11/10/1999 Recorded      Hep B 10/21/1998 Recorded      Hep B 06/17/1998 Recorded      varicella 05/08/1998 Recorded      Hep B 05/08/1998 Recorded      OPV 08/19/1993 Recorded      DTaP 08/19/1993 Recorded      MMR (measles/mumps/rubella) 08/19/1993 Recorded      Hib 04/01/1993 Recorded      OPV 04/23/1991 Recorded      DTaP 04/23/1991 Recorded      MMR (measles/mumps/rubella) 04/23/1991 Recorded      OPV 03/01/1989 Recorded      DTaP 03/01/1989 Recorded      OPV 1988 Recorded      DTaP 1988 Recorded      OPV 1988 Recorded      DTaP 1988 Recorded

## 2022-02-15 NOTE — TELEPHONE ENCOUNTER
---------------------  From: Eboni Nicholson LPN (eRx Pool (32224_[facility]))   To: [facility] Message Pool ([address]_[place] [facility] [place]);     Sent: 9/9/2019 7:09:22 PM CDT  Subject: FW: Medication Management   Due Date/Time: 9/8/2019 1:22:00 PM CDT     Medication Refill needing approval    PCP:   SHRAVAN    Medication:   Depakote ER 250mg 3 tabs PO daily  Last Filled:  3/12/19    Quantity:  90  Refills:  1  CSA on file?   n/a     Date of last office visit and reason:   8/20/19 Office Visit Note  Date of last labs pertaining to condition:  n/a    Note:  Please advise on refill. Pt was given 2 months supply in March.    Return to Clinic order placed?  No- no mention in [facility]'s last notes on when pt is to return.    Resource:   pharmacy          ------------------------------------------  From: [facility] #[id number]  To: Rhianna Bryant MD  Sent: September 7, 2019 1:22:47 PM CDT  Subject: Medication Management  Due: September 8, 2019 1:22:47 PM CDT    ** On Hold Pending Signature **  Drug: divalproex sodium (Depakote  mg oral tablet, extended release)  3 TAB(S) ORAL DAILY  Quantity: 90 tab(s)     Days Supply: 0         Refills: 0  Substitutions Allowed  Notes from Pharmacy:     Dispensed Drug: divalproex sodium (divalproex sodium 250 mg oral tablet, extended release)  TAKE 3 TABLETS BY MOUTH DAILY  Quantity: 90 tab(s)     Days Supply: 30        Refills: 0  Substitutions Allowed  Notes from Pharmacy:   ---------------------------------------------------------------  From: Rose Terry CMA ([facility] Message Pool ([address]_[facility]))   To: Rhianna Bryant MD;     Sent: 9/10/2019 9:56:03 AM CDT  Subject: FW: Medication Management   Due Date/Time: 9/8/2019 1:22:00 PM CDT---------------------  From: Rhianna Bryant MD   To: [facility] #[id number]    Sent: 9/10/2019 2:28:46 PM CDT  Subject: FW: Medication Management     ** Submitted: **  Complete:divalproex sodium (divalproex sodium 500 mg oral tablet, extended  release)   Signed by Rhianna Bryant MD  9/10/2019 2:28:00 PM    ** Submitted: **  Complete:divalproex sodium (Depakote  mg oral tablet, extended release)   Signed by Rhianna Bryant MD  9/10/2019 2:28:00 PM    ** Approved **  divalproex sodium (DIVALPROEX EXTENDED RELEASE 250MG T)  TAKE 3 TABLETS BY MOUTH DAILY  Qty:  90 tab(s)        Days Supply:  30        Refills:  0          Substitutions Allowed     Route To Pharmacy - [facility] #[id number]

## 2022-02-15 NOTE — NURSING NOTE
Generalized Anxiety Disorder Screening Entered On:  12/19/2019 3:15 PM CST    Performed On:  12/19/2019 3:14 PM CST by Serina Olmstead MA               Generalized Anxiety Disorder Screening   MARY Nervous, Anxious On Edge :   Several days   MARY Control Worrying B :   Not at all   MARY Worrying Too Much :   Not at all   MARY Restless :   Not at all   MARY Easily Annoyed/Irritable :   Several days   MARY Afraid :   Not at all   MARY Trouble Relaxing :   Not at all   MARY Total Screening Score :   2    Serina Olmstead MA - 12/19/2019 3:14 PM CST

## 2022-02-15 NOTE — PROGRESS NOTES
Patient:   MATHEUS REARDON            MRN: [id number]            FIN: [id number]               Age:   29 years     Sex:  Female     :  1990   Associated Diagnoses:   Acute pansinusitis   Author:   Claus Alonzo MD      Impression and Plan   Diagnosis     Acute pansinusitis (DRG61-RV J01.40).     Course:  Worsening.    Orders     Orders   Charges (Evaluation and Management):  68771 office outpatient visit 15 minutes (Charge) (Order): Quantity: 1, Acute pansinusitis.     Orders (Selected)   Prescriptions  Prescribed  Augmentin 875 mg-125 mg oral tablet: = 1 tab(s), po, bidac, # 20 tab(s), 0 Refill(s), Type: Maintenance, Pharmacy: [facility] #[id number], 1 tab(s) Oral bidac,x10 day(s), 66, in, 20 11:30:00 CDT, Height Measured, 174, lb, 20 14:26:00 CDT, Weight Measured.        Visit Information      Date of Service: 2020 10:44 am  Performing Location: [facility]  Encounter#: [id number]      Primary Care Provider (PCP):  Rhianna Bryant MD    NPI# [id number]   Visit type:  New symptom.    Accompanied by:  No one.    Source of history:  Self.    History limitation:  None.       Chief Complaint   Chief complaint discussed and confirmed correct.     2020 11:30 AM CDT    verbal consent for video visit to discuss sinus pain        History of Present Illness             The patient presents with sinus problem.  The sinus problem is located in the frontal sinus, maxillary sinus and right.  The sinus problem is characterized by nasal congestion, headache and facial pain.  The severity of the sinus problem is severe.  The sinus problem is constant.  The sinus problem has lasted for 2 day(s).  The context of the sinus problem: occurred in association with illness.  Associated symptoms consist of none.        Review of Systems   Constitutional:  Negative.    Eye:  Negative.    Ear/Nose/Mouth/Throat:  Nasal congestion, Sinus pain.    Respiratory:  Negative.    Cardiovascular:   Negative.    Gastrointestinal:  Negative.    Genitourinary:  Negative.    Hematology/Lymphatics:  Negative.    Endocrine:  Negative.    Immunologic:  Negative.    Musculoskeletal:  Negative.    Integumentary:  Negative.    Neurologic:  Negative.    Psychiatric:  Negative.    All other systems reviewed and negative      Health Status   Allergies:    Allergic Reactions (Selected)  Severity Not Documented  Biaxin (No reactions were documented)  Keflex (No reactions were documented)   Medications:  (Selected)   Prescriptions  Prescribed  Augmentin 875 mg-125 mg oral tablet: = 1 tab(s), po, bidac, # 20 tab(s), 0 Refill(s), Type: Maintenance, Pharmacy: [facility] #[id number], 1 tab(s) Oral bidac,x10 day(s), 66, in, 08/05/20 11:30:00 CDT, Height Measured, 174, lb, 05/14/20 14:26:00 CDT, Weight Measured  DULoxetine 60 mg oral delayed release capsule: = 1 cap(s) ( 60 mg ), Oral, bid, # 180 cap(s), 1 Refill(s), Type: Maintenance, Pharmacy: [facility] #[id number], 1 cap(s) Oral bid  Depakote  mg oral tablet, extended release: = 1 tab(s) ( 500 mg ), Oral, daily, # 30 tab(s), 0 Refill(s), Type: Maintenance, Pharmacy: [facility] #[id number], 1 tab(s) Oral daily, 66, in, 07/23/20 8:57:00 CDT, Height Measured, 174, lb, 05/14/20 14:26:00 CDT, Weight Measured   Problem list:    All Problems  Biceps tendonitis / SNOMED CT 590387326 / Confirmed  Bipolar / SNOMED CT 370035693 / Confirmed  Subacromial impingement / SNOMED CT 926067146 / Confirmed  Resolved: Pregnancy / SNOMED CT 856618523  Resolved: Kidney stone / SNOMED CT 473583760  Resolved: Pregnancy / SNOMED CT 099103774  Resolved: Pregnancy / SNOMED CT 215011735      Histories   Past Medical History:    Active  Bipolar (789415338)  Resolved  Kidney stone (506312570):  Resolved.  Pregnancy (355033157):  Resolved on 9/11/2007 at 17 years.  Pregnancy (659883915):  Resolved on 4/30/2009 at 18 years.   Family History:    Diabetes mellitus  Grandmother (M)  Heart  disease  Grandmother (M)     Procedure history:    Arthroscopy of shoulder - Right (SNOMED CT 633553725) performed by Shai Wright MD on 1/6/2020 at 29 Years.  Comments:  1/17/2020 10:48 AM Jo Ann Lindsay  Arthroscopic tenotomy of the long head biceps tendon.  Subacromial decompression (SNOMED CT 684083377) performed by Shai Wright MD on 2/25/2019 at 28 Years.  Comments:  2/28/2019 8:20 AM Jo Ann Lindsay  Right. including anterior acromioplasty.  Tonsillectomy and adenoidectomy (SNOMED CT 365595190) performed by Deloris Robertson MD on 4/23/2018 at 27 Years.  Tubal sterilization (SNOMED CT 098427072) performed by Jose E Long MD on 12/2/2011 at 21 Years.  NVD (Normal Vaginal Delivery) (ICD-9-) performed by Al Philip MD on 8/25/2011 at 21 Years.  Comments:  8/31/2011 8:19 AM CDT - Jo Ann Floyd  Female - 39w1d.  Extraction of wisdom tooth (SNOMED CT 286498793).   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  Activity level: Moderate physical work.      Home and Environment Assessment            Marital status: Single.  Spouse/Partner name: shares custody with ex-partner.  Lives with Children,               Significant other.  3 children.  Living situation: Home/Independent.      Nutrition and Health Assessment            Type of diet: Regular.      Sexual Assessment            Sexually active: Yes.  Sexual orientation: Heterosexual.        Physical Examination   General:  Alert and oriented X 3, No acute distress, Warm, Pink, Intact.         Appearance: Within normal limits, Well nourished, Calm.         Hydration: Within normal limits.         Psych: Within normal limits, Appropriate mood and affect, Cooperative,  Normal judgment.    HENT:  tenderness over the right frontal and maxillary areas.

## 2022-02-15 NOTE — NURSING NOTE
Comprehensive Intake Entered On:  3/27/2019 12:30 PM CDT    Performed On:  3/27/2019 12:26 PM CDT by Arleen Lazcano               Summary   Chief Complaint :   Pt here for refill of sleep meds. Also still c/o shoulder pain after surgery.    Menstrual Status :   Menarcheal   Weight Measured :   170 lb(Converted to: 170 lb 0 oz, 77.11 kg)    Height Measured :   66 in(Converted to: 5 ft 6 in, 167.64 cm)    Body Mass Index :   27.44 kg/m2 (HI)    Body Surface Area :   1.89 m2   Systolic Blood Pressure :   95 mmHg   Diastolic Blood Pressure :   63 mmHg   Mean Arterial Pressure :   74 mmHg   Peripheral Pulse Rate :   51 bpm (LOW)    Temperature Tympanic :   97.5 DegF(Converted to: 36.4 DegC)  (LOW)    Arleen Lazcano - 3/27/2019 12:26 PM CDT   Health Status   Allergies Verified? :   Yes   Medication History Verified? :   Yes   Immunizations Current :   Yes   Medical History Verified? :   Yes   Pre-Visit Planning Status :   Completed   Tobacco Use? :   Never smoker   Arleen Lazcano - 3/27/2019 12:26 PM CDT   Consents   Consent for Immunization Exchange :   Consent Granted   Consent for Immunizations to Providers :   Consent Granted   Arleen Lazcano - 3/27/2019 12:26 PM CDT   Meds / Allergies   (As Of: 3/27/2019 12:30:32 PM CDT)   Allergies (Active)   Biaxin  Estimated Onset Date:   Unspecified ; Created By:   Yadi Osborn LPN; Reaction Status:   Active ; Substance:   Biaxin ; Updated By:   Yadi Osborn LPN; Reviewed Date:   3/27/2019 12:30 PM CDT      Keflex  Estimated Onset Date:   Unspecified ; Created By:   Yadi Osborn LPN; Reaction Status:   Active ; Category:   Drug ; Substance:   Keflex ; Type:   Allergy ; Updated By:   Yadi Osborn LPN; Reviewed Date:   3/27/2019 12:30 PM CDT        Medication List   (As Of: 3/27/2019 12:30:32 PM CDT)   Prescription/Discharge Order    zolpidem  :   zolpidem ; Status:   Prescribed ; Ordered As Mnemonic:   zolpidem 5 mg oral tablet ; Simple  Display Line:   5 mg, 1 tab(s), Oral, hs, PRN: as needed for insomnia, 30 tab(s), 0 Refill(s) ; Ordering Provider:   Rhianna Bryant MD; Catalog Code:   zolpidem ; Order Dt/Tm:   3/12/2019 4:02:08 PM          divalproex sodium  :   divalproex sodium ; Status:   Prescribed ; Ordered As Mnemonic:   Depakote  mg oral tablet, extended release ; Simple Display Line:   750 mg, 3 tab(s), Oral, daily, 90 tab(s), 1 Refill(s) ; Ordering Provider:   Rhianna Bryant MD; Catalog Code:   divalproex sodium ; Order Dt/Tm:   3/12/2019 4:00:02 PM          divalproex sodium 500 mg oral tablet, extended release  :   divalproex sodium 500 mg oral tablet, extended release ; Status:   Prescribed ; Ordered As Mnemonic:   divalproex sodium 500 mg oral tablet, extended release ; Simple Display Line:   1 tab(s), Oral, daily, 90 tab(s) ; Ordering Provider:   Rhianna Bryant MD; Catalog Code:   divalproex sodium ; Order Dt/Tm:   3/8/2019 10:00:59 AM          DULoxetine 60 mg oral delayed release capsule  :   DULoxetine 60 mg oral delayed release capsule ; Status:   Prescribed ; Ordered As Mnemonic:   DULoxetine 60 mg oral delayed release capsule ; Simple Display Line:   1 cap(s), Oral, daily, 90 cap(s) ; Ordering Provider:   Rhianna Bryant MD; Catalog Code:   DULoxetine ; Order Dt/Tm:   3/8/2019 10:00:53 AM          acetaminophen-oxycodone  :   acetaminophen-oxycodone ; Status:   Prescribed ; Ordered As Mnemonic:   Percocet 10/325 oral tablet ; Simple Display Line:   1 tab(s), po, bid, PRN: pain, 50 tab(s), 0 Refill(s) ; Ordering Provider:   Rhianna Bryant MD; Catalog Code:   acetaminophen-oxycodone ; Order Dt/Tm:   2/1/2019 1:17:36 PM          diclofenac topical  :   diclofenac topical ; Status:   Prescribed ; Ordered As Mnemonic:   Voltaren 1% topical gel ; Simple Display Line:   2 gm, Topical, qid, not to exceed 8 grams/day/single joint of upper extremities, PRN: for pain, 100 gm, 1 Refill(s) ; Ordering Provider:    Rhianna Bryant MD; Catalog Code:   diclofenac topical ; Order Dt/Tm:   2/1/2019 1:19:14 PM          acetaminophen-oxycodone  :   acetaminophen-oxycodone ; Status:   Prescribed ; Ordered As Mnemonic:   Percocet 5/325 oral tablet ; Simple Display Line:   1-2 tab(s), po, q6 hrs, PRN: as needed for pain, 24 tab(s), 0 Refill(s) ; Ordering Provider:   Rhianna Bryant MD; Catalog Code:   acetaminophen-oxycodone ; Order Dt/Tm:   1/21/2019 10:29:40 AM          meloxicam  :   meloxicam ; Status:   Prescribed ; Ordered As Mnemonic:   meloxicam 15 mg oral tablet ; Simple Display Line:   15 mg, 1 tab(s), PO, Daily, PRN: for pain, 30 tab(s), 3 Refill(s) ; Ordering Provider:   Rhianna Bryant MD; Catalog Code:   meloxicam ; Order Dt/Tm:   9/11/2018 2:51:01 PM          lidocaine topical  :   lidocaine topical ; Status:   Prescribed ; Ordered As Mnemonic:   lidocaine 5% topical ointment ; Simple Display Line:   1 jac, TOP, TID, for 7 day(s), PRN: for itching, 35 gm, 1 Refill(s) ; Ordering Provider:   Rhianna Bryant MD; Catalog Code:   lidocaine topical ; Order Dt/Tm:   8/8/2018 9:17:58 AM          triamcinolone topical  :   triamcinolone topical ; Status:   Prescribed ; Ordered As Mnemonic:   triamcinolone 0.1% topical ointment ; Simple Display Line:   1 jac, TOP, TID, for 7 day(s), PRN: for rash, 30 gm, 1 Refill(s) ; Ordering Provider:   Rhianna Bryant MD; Catalog Code:   triamcinolone topical ; Order Dt/Tm:   8/8/2018 9:17:45 AM

## 2022-02-15 NOTE — NURSING NOTE
Comprehensive Intake Entered On:  2019 10:05 AM CST    Performed On:  2019 10:00 AM CST by Rose Terry CMA   Peripheral Pulse Rate :   90 bpm   Rose Terry CMA - 2019 10:13 AM CST   Chief Complaint :   Med check. Pre-op. DOS 2020-STEPHANMilady Wright.   Rose Terry CMA - 2019 10:06 AM CST     Menstrual Status :   Menarcheal   Weight Measured :   162.4 lb(Converted to: 162 lb 6 oz, 73.66 kg)    Systolic Blood Pressure :   114 mmHg   Diastolic Blood Pressure :   64 mmHg   Mean Arterial Pressure :   81 mmHg   BP Site :   Right arm   BP Method :   Manual   HR Method :   Electronic   Temperature Tympanic :   97.5 DegF(Converted to: 36.4 DegC)  (LOW)    Rose Terry CMA - 2019 10:00 AM CST     Rose Terry CMA - 2019 10:13 AM CST     Health Status   Allergies Verified? :   Yes   Medication History Verified? :   Yes   Immunizations Current :   Yes   Pre-Visit Planning Status :   Completed   Tobacco Use? :   Former smoker   Rose Terry CMA - 2019 10:00 AM CST   Demographics   Last Name :   Milan   Address :   [address] [place] [address]   First Name :   Orly Thomas Initial :   HELIO   Responsible Party Date of Birth () :   1990 CDT   City :   [place]   State :   [place]   Zip Code :   [address]   Rose Terry CMA - 2019 10:00 AM CST   Providers Grid   Provider Name :    Bao Cai specoRse Monet CMA - 2019 10:00 AM CST         Ancillary Services Grid   Name :    [id number]                Rose Terry CMA - 2019 10:00 AM CST         Consents   Consent for Immunization Exchange :   Consent Granted   Consent for Immunizations to Providers :   Consent Granted   Rose Terry CMA - 2019 10:00 AM CST   Meds / Allergies   (As Of: 2019 10:05:19 AM CST)   Allergies (Active)   Biaxin  Estimated Onset Date:   Unspecified ; Created By:   Yadi Osborn LPN; Reaction Status:   Active ; Substance:   Biaxin ;  Updated By:   Yadi Osborn LPN; Reviewed Date:   3/27/2019 12:30 PM CDT      Keflex  Estimated Onset Date:   Unspecified ; Created By:   Yadi Osborn LPN; Reaction Status:   Active ; Category:   Drug ; Substance:   Keflex ; Type:   Allergy ; Updated By:   Yadi Osborn LPN; Reviewed Date:   3/27/2019 12:30 PM CDT        Medication List   (As Of: 12/19/2019 10:05:19 AM CST)   Prescription/Discharge Order    divalproex sodium 250 mg oral tablet, extended release  :   divalproex sodium 250 mg oral tablet, extended release ; Status:   Prescribed ; Ordered As Mnemonic:   divalproex sodium 250 mg oral tablet, extended release ; Simple Display Line:   See Instructions, TAKE 3 TABLETS BY MOUTH DAILY, 90 tab(s) ; Ordering Provider:   Rhianna Bryant MD; Catalog Code:   divalproex sodium ; Order Dt/Tm:   11/12/2019 9:46:25 PM CST          divalproex sodium 250 mg oral tablet, extended release  :   divalproex sodium 250 mg oral tablet, extended release ; Status:   Prescribed ; Ordered As Mnemonic:   divalproex sodium 250 mg oral tablet, extended release ; Simple Display Line:   3 tab(s), Oral, daily, 90 tab(s) ; Ordering Provider:   Rhianna Bryant MD; Catalog Code:   divalproex sodium ; Order Dt/Tm:   10/10/2019 4:30:07 PM CDT          DULoxetine 60 mg oral delayed release capsule  :   DULoxetine 60 mg oral delayed release capsule ; Status:   Prescribed ; Ordered As Mnemonic:   DULoxetine 60 mg oral delayed release capsule ; Simple Display Line:   1 cap(s), Oral, daily, 90 cap(s) ; Ordering Provider:   Rhianna Bryant MD; Catalog Code:   DULoxetine ; Order Dt/Tm:   10/10/2019 4:30:07 PM CDT

## 2022-02-15 NOTE — PROGRESS NOTES
Patient:   MATHEUS REARDON            MRN: [id number]            FIN: [id number]               Age:   27 years     Sex:  Female     :  1990   Associated Diagnoses:   Vaginitis   Author:   Margie Kumar      Visit Information      Date of Service: 2017 11:20 am  Performing Location: [facility]  Encounter#: [id number]      Primary Care Provider (PCP):  Selam Mcghee MD    NPI# [id number]   Visit type:  General concerns, New symptom.    Accompanied by:  No one.    Source of history:  Self.    History limitation:  None.       Chief Complaint   2017 11:26 AM CST  Patient is here with c/o abnormal discharge and vaginal irritation.      History of Present Illness   Matheus is here with complaints of white, thin, itchy vaginal discharge with makes her vulva sore. Onset of sx was yesterday. No new partners.  Her partner has no symptoms.  Denies pelvic pain, fever or recent antibiotic use.  She has monthly menstrual cycles but her last period was much shorter than usual and she has had some breast tenderness and food aversions.  Wants pregnancy test.  Works nights on [employer] when her kids are with her ex-partner.  Assembles walk-in refrigeration units.  Likes her co-workers.  Has 3 children.  One of her daughters is begging for a pet cat for [employer].      Review of Systems   ROS negative except as noted in HPI.      Health Status   Allergies:    Allergic Reactions (Selected)  Severity Not Documented  Biaxin (No reactions were documented)  Keflex (No reactions were documented)   Problem list:    All Problems  Bipolar / SNOMED CT 566566125 / Confirmed  Resolved: Kidney stone / SNOMED CT 347896605  Resolved: Pregnancy / SNOMED CT 304911411  Resolved: Pregnancy / SNOMED CT 817671726  Resolved: Pregnancy / SNOMED CT 798615069   Medications:  (Selected)   Documented Medications  Documented  ibuprofen 800 mg oral tablet: 1 tab(s) ( 800 mg ), po, prn, 0 Refill(s), Type: Maintenance       Histories   Past Medical History:    Active  Bipolar (080715340)  Resolved  Kidney stone (574208385):  Resolved.  Pregnancy (367838676):  Resolved on 9/11/2007 at 17 years.  Pregnancy (695143658):  Resolved on 4/30/2009 at 18 years.   Family History:    Diabetes mellitus  Grandmother (M)  Heart disease  Grandmother (M)     Procedure history:    Tubal sterilization (060405825) on 12/2/2011 at 21 Years.  NVD (Normal Vaginal Delivery) (650) on 8/25/2011 at 21 Years.  Comments:  8/31/2011 8:19 AM - Jo Ann Floyd  Female - 39w1d.  Extraction of wisdom tooth (914913740).   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  Activity level: Moderate physical work.      Home and Environment Assessment            Marital status: Single.  Spouse/Partner name: shares custody with ex-partner.  Lives with Children,               Significant other.  3 children.  Living situation: Home/Independent.      Nutrition and Health Assessment            Type of diet: Regular.      Exercise and Physical Activity Assessment: Occasional exercise      Sexual Assessment            Sexually active: Yes.  Sexual orientation: Heterosexual.        Physical Examination   Gynecologic:          External genitalia: Normal genitalia for age, No urethral discharge, No lesions, Perineum ( No erythema, No lesion, No tear, No tenderness ), Vulva ( Erythema, thin, white discharge is present ).         Vagina: Discharge ( White ), Mucosa ( Within normal limits ), No lesions, No mass.         Cervix: Within normal limits.         Uterus: Mobile, Not tender.    Vital Signs   12/19/2017 11:26 AM CST Peripheral Pulse Rate 59 bpm  LOW    HR Method Electronic    Systolic Blood  Pressure 96 mmHg    Diastolic Blood Pressure 60 mmHg    Mean Arterial Pressure 72 mmHg    BP Site Right arm    BP Method Electronic      Measurements from flowsheet : Measurements   12/19/2017 11:26 AM CST Height Measured - Standard 66 in    Weight Measured - Standard 165.8 lb    BSA 1.87 m2    Body Mass Index 26.76 kg/m2    Ht/Wt Measurement Refused by Patient? No      General:  No acute distress.    Eye:  Normal conjunctiva, Vision unchanged.    HENT:  Normal hearing.    Respiratory:  Respirations are non-labored.    Musculoskeletal:  Normal gait.    Integumentary:  Warm, Dry, Pink.    Neurologic:  Alert, Oriented, Normal sensory, Normal motor function.    Psychiatric:  Cooperative, Appropriate mood & affect, Normal judgment.       Review / Management   Results review      Impression and Plan   Diagnosis     Vaginitis (MFJ84-DY N76.0).     Plan   Wet prep and GC/CT screen collected.  Results pending.  UPT negative.  Will notify pt when resuls available.    Wet prep postitive for yeast.  Will treat with Diflucan.

## 2022-02-15 NOTE — NURSING NOTE
Generalized Anxiety Disorder Screening Entered On:  3/13/2019 10:09 AM CDT    Performed On:  3/12/2019 10:07 AM CDT by Melanie Mccormick               Generalized Anxiety Disorder Screening   MARY Nervous, Anxious On Edge :   Not at all   MARY Control Worrying B :   Several days   MARY Worrying Too Much :   Not at all   MARY Restless :   Not at all   MARY Easily Annoyed/Irritable :   Not at all   MARY Afraid :   More than half the days   MARY Trouble Relaxing :   Several days   MARY Total Screening Score :   4    MARY Difficulty with Work, Home, Others :   Not difficult at all   Melanie Mccormick - 3/13/2019 10:07 AM CDT

## 2022-02-15 NOTE — PROGRESS NOTES
Subjective      pt here for preop H and P for right shoulder surgery scheduled on 2/25/2019 at [facility].       PMHx  no coagulopathy or bleeding disorders,      Sug Hx: no previous right shoulder surgery, T/A without complications      Fam Hx: no serious reactions to anesthesia, no blood clots      Soc Hx: nonsmoker, no DOA, works full time      All:Keflex causes unknown reaction      Current meds:Percocet 5/325 2 po qhs started mid-January, stopped taking all nsaids, also 2 other meds that pt prefers not have listed here for privacy reasons, she can review with Dr. Wright/anesthesia day of surgery but these should not affect surgery at all, she will take these as scheduled in the evening.   Review of Systems       right shoulder pain that interferes with sleep and activities, 12 point ROS otherwise neg  Objective   Vitals & Measurements    T: 97.2   F (Tympanic)  HR: 63(Peripheral)  BP: 90/58  SpO2: 99%  WT: 167.8 lb    Physical Exam       Review of systems is negative except as per HPI including no fevers, chills, sore throat, runny nose, nausea, vomiting, constipation, diarrhea, rash or new skin lesions, chest pain, palpitations, slurred speech, new paresthesia, shortness of breath or wheeze.               Exam:       see vitals listed below       General: alert and oriented ×3 no acute distress.       HEENT: Normocephalic and atraumatic.        Eyes pupils are equal round and reactive to light extraocular motion is intact. normal conjunctiva       Hearing is grossly normal and there is no otorrhea. Tympanic membranes are pearly grey with a normal light reflex.       Nares are patent there is no rhinorrhea.        Mucous membranes are moist and pink.       Chest: has bilateral rise with no increased work of breathing. clear to auscultation without wheezes, rhonchi, or rales.       Cardiovascular: normal perfusion and brisk capillary refill. S1S2 with regular rate and rhythm and no murmurs, gallops or rubs.        Musculoskeletal: no gross focal abnormalities and normal gait.       Neuro: no gross focal abnormalities and memory seems intact.  CN 2-12 are grossly intact.       Psychiatric: speech is clear and coherent and fluent. Patient dressed appropriately for the weather. Mood is appropriate and affect is full.                               Discussed with patient to return to clinic if symptoms worsen or do not improve.  Assessment/Plan       Biceps tendonitis (M75.21)         Ordered:          CBC (includes diff/plt)* ([facility]), Specimen Type: Blood, Collection Date: 02/13/19 10:31:00 CST                Preop testing (Z01.818)         Ordered:          CBC (includes diff/plt)* ([facility]), Specimen Type: Blood, Collection Date: 02/13/19 10:31:00 CST               ASA 2 no contraindication for surgery.

## 2022-02-15 NOTE — NURSING NOTE
Quick Intake Entered On:  5/14/2020 2:27 PM CDT    Performed On:  5/14/2020 2:26 PM CDT by Chari Olivera RN               Summary   Menstrual Status :   Menarcheal   Weight Measured :   174 lb(Converted to: 174 lb 0 oz, 78.93 kg)    Height Measured :   66 in(Converted to: 5 ft 6 in, 167.64 cm)    Body Mass Index :   28.08 kg/m2 (HI)    Body Surface Area :   1.92 m2   Systolic Blood Pressure :   108 mmHg   Diastolic Blood Pressure :   72 mmHg   Mean Arterial Pressure :   84 mmHg   Peripheral Pulse Rate :   87 bpm   Temperature Tympanic :   97.8 DegF(Converted to: 36.6 DegC)  (LOW)    Chari Olivera RN - 5/14/2020 2:26 PM CDT

## 2022-02-15 NOTE — TELEPHONE ENCOUNTER
Entered by Melani Horne on September 01, 2020 8:53:24 AM CDT  Med Refill      Date of last office visit and reason:  8/5/20 for sinusitis with RENATA. 7/23/20 (telemed) with MYRTLEP for mood.  Depakote increased a that time.  Patient was advised to RTC to have liver functions (not done) and advised to f/up in 1-3 weeks (not done).  Will forward to provider for auth/denial.         Date of last Med Check / Px:   7/23/20  Date of last labs pertaining to med:  LFT's 5/2020    RTC order in chart:  Yes, overdue for labs and f/up    For Protocol refill, has patient been contacted:  not filled, forwarded to provider for auth/denial of potentially dangerous medication.     Left message at 8:53am on unidentified voicemail for patient to return call to discuss.             ------------------------------------------  From: [facility] #[id number]  To: Rhianna Bryant MD  Sent: August 30, 2020 10:24:49 AM CDT  Subject: Medication Management  Due: August 28, 2020 8:16:52 PM CDT     ** On Hold Pending Signature **     Dispensed Drug: divalproex sodium (divalproex sodium 500 mg oral tablet, extended release), TAKE 1 TABLET BY MOUTH DAILY  Quantity: 30 tab(s)  Days Supply: 30  Refills: 0  Substitutions Allowed  Notes from Pharmacy:  ---------------------------------------------------------------  From: Melani Horne (eRx Pool (32224_[facility]))   To: Rhianna Bryant MD;     Sent: 9/1/2020 8:53:35 AM CDT  Subject: FW: Medication Management   Due Date/Time: 8/31/2020 10:24:00 AM CDT---------------------  From: Rhianna Bryant MD   To: [facility] #[id number]    Sent: 9/2/2020 8:34:32 AM CDT  Subject: FW: Medication Management     ** Submitted: **  Complete:divalproex sodium (Depakote  mg oral tablet, extended release)   Signed by Rhianna Bryant MD  9/2/2020 1:34:00 PM [facility]    ** Approved **  divalproex sodium (DIVALPROEX EXTENDED RELEASE 500MG T)  TAKE 1 TABLET BY MOUTH DAILY  Qty:  30 tab(s)         Days Supply:  30        Refills:  0          Substitutions Allowed     Route To Pharmacy - [facility] #[id number]

## 2022-02-15 NOTE — TELEPHONE ENCOUNTER
---------------------  From: Tracey Peraza CMA (eRx Pool (32224_[facility]))   To: [facility] Message Pool (32224_[place] [facility] [place]);     Sent: 10/10/2019 11:56:23 AM CDT  Subject: FW: Medication Management   Due Date/Time: 10/11/2019 9:04:00 AM CDT       Medication Refill needing approval    PCP:   SH[facility]    Medication:   divalproex 250mg  Last Filled:  9/10/19    Quantity:  90  Refills:  0  CSA on file?   no    Date of last office visit and reason:   8/20/19; office visit  Date of last labs pertaining to condition:  9/11/18    Return to Clinic order placed?  yes; overdue for px/pap. Last med refill appt was March 2019.        ------------------------------------------  From: [facility] #[id number]  To: Rhianna Bryant MD  Sent: October 10, 2019 9:04:15 AM CDT  Subject: Medication Management  Due: October 11, 2019 9:04:15 AM CDT    ** On Hold Pending Signature **  Drug: divalproex sodium (Depakote  mg oral tablet, extended release)  3 TAB(S) ORAL DAILY  Quantity: 90 tab(s)  Days Supply: 0  Refills: 0  Substitutions Allowed  Notes from Pharmacy:     Dispensed Drug: divalproex sodium (divalproex sodium 250 mg oral tablet, extended release)  TAKE 3 TABLETS BY MOUTH DAILY  Quantity: 90 tab(s)  Days Supply: 30  Refills: 0  Substitutions Allowed  Notes from Pharmacy:     ** On Hold Pending Signature **  Drug: DULoxetine (DULoxetine 60 mg oral delayed release capsule)  1 CAP(S) ORAL DAILY  Quantity: 90 cap(s)  Days Supply: 0  Refills: 0  Substitutions Allowed  Notes from Pharmacy:     Dispensed Drug: DULoxetine (DULoxetine 60 mg oral delayed release capsule)  TAKE 1 CAPSULE BY MOUTH DAILY  Quantity: 90 cap(s)  Days Supply: 90  Refills: 0  Substitutions Allowed  Notes from Pharmacy:   ---------------------------------------------------------------  From: Rose Terry CMA ([facility] Message Pool (32224_[facility]))   To: Rhianna Bryant MD;     Sent: 10/10/2019 11:57:02 AM CDT  Subject: FW: Medication Management    Due Date/Time: 10/11/2019 9:04:00 AM CDT---------------------  From: Rhianna Bryant MD   To: [facility] #[id number]    Sent: 10/10/2019 4:30:09 PM CDT  Subject: FW: Medication Management     ** Submitted: **  Complete:DULoxetine (DULoxetine 60 mg oral delayed release capsule)   Signed by Rhianna Bryant MD  10/10/2019 4:30:00 PM    ** Submitted: **  Complete:divalproex sodium (divalproex sodium 250 mg oral tablet, extended release)   Signed by Rhianna Bryant MD  10/10/2019 4:30:00 PM    ** Approved **  divalproex sodium (DIVALPROEX EXTENDED RELEASE 250MG T)  TAKE 3 TABLETS BY MOUTH DAILY  Qty:  90 tab(s)        Days Supply:  30        Refills:  0          Substitutions Allowed     Route To Pharmacy - [facility] #[id number]       ** Approved **  DULoxetine (DULOXETINE DR 60MG CAPSULES)  TAKE 1 CAPSULE BY MOUTH DAILY  Qty:  90 cap(s)        Days Supply:  90        Refills:  0          Substitutions Allowed     Route To [facility] #[id number]

## 2022-02-15 NOTE — PROGRESS NOTES
Chief Complaint    Pt here for refill of sleep meds. Also still c/o shoulder pain after surgery.  History of Present Illness      Patient reports she continues to have problems with insomnia.  Overall however her mood has much improved since increasing her Depakote from 500 mg daily to 750 mg daily.      She has less irritability and her mood is happier.  She is having less difficulty getting along with her significant other.  She continues to be frustrated and stressed out about her job.  Workers comp has denied her claim to cover the shoulder surgery.  She is also been told that if she does not have a  fax over some information or paperwork then she will be fired if she is not able to go to work without any restrictions.  However she has right shoulder pain that is not well controlled with Tylenol and ibuprofen.  Her orthopedic surgeon's office has told her that she should not require these medications at this time.  She reports that her physical therapist mentioned to her that he was concerned that she might still have some impingement or that the tendon may still be slipping because of how he felt her progress was going with physical therapy.         Physical Exam   Vitals & Measurements    T: 97.5   F (Tympanic)  HR: 51(Peripheral)  BP: 95/63     HT: 66 in  WT: 170 lb  BMI: 27.44       Review of systems is negative except as per HPI including:  no fevers, chills, sore throat, runny nose, nausea, vomiting, constipation, diarrhea, rash or new skin lesions, chest pain, palpitations, slurred speech, new paresthesia, shortness of breath or wheeze.      Exam:      General: alert and oriented ×3 no acute distress.      HEENT: pupils are equal round and reactive to light extraocular motion is intact. Normocephalic and atraumatic.       Hearing is grossly normal and there is no otorrhea.       Nares are patent there is no rhinorrhea.       Mucous membranes are moist and pink.      Chest: has bilateral rise with no  increased work of breathing.      Cardiovascular: normal perfusion and brisk capillary refill.      Musculoskeletal: no gross focal abnormalities and normal gait.  Right shoulder shows incisions are well-healed.  She has hypersensitivity to the right shoulder region with very light touch.  No sweating in this area and no changes in skin color no swelling.      Neuro: no gross focal abnormalities and memory seems intact.      Psychiatric: speech is clear and coherent and fluent. Patient dressed appropriately for the weather. Mood is appropriate and affect is full.                     Discussed with patient to return to clinic if symptoms worsen or do not improve  Assessment/Plan       Insomnia (G47.00)        Suspect his insomnia is related to her bipolar disorder as well as some additional life stressors related to instability with her job.  Would like patient to try using the quetiapine instead of the Ambien.  Would like her to start at a very low dose because it can certainly cause some hypersomnolence which can be worsened when used in addition to the Depakote.  Hopefully she will find even better control on her mood also.        In regards to her ongoing shoulder pain discussed with patient that its of red flag to me that she is having pain that is not well controlled with Tylenol and ibuprofen when her surgeon reports that by now which should be able to be controlled with Tylenol and ibuprofen.  I understand that she is not trying to get additional narcotics however I would like for her to explain this to her orthopedic surgeon and see if this may be complex regional pain syndrome versus a sign that perhaps there is something not quite right with her postoperative recovery.  15 minutes was spent with patient in direct face-to-face contact of which greater than 50% of the time was spent counseling patient and coordinating care.         Ordered:          QUEtiapine, 1/2 tab(s), Oral, qhs, # 15 EA, 3 Refill(s),  Type: Maintenance, Pharmacy: [id number], 1/2 tab(s) Oral qhs, (Ordered)                Orders:         Occupational Therapy Evaluation and Treatment (Request), Shoulder pain         Physical Therapy Consult (Request), Referred to: [facility], Biceps tendonitis  Subacromial impingement         Return to Clinic (Request), RFV: f/u shoulder pain, Return in 4 weeks         XR Shoulder Complete Right (Request), Priority: STAT, Shoulder pain  Patient Information     Name:MATHEUS REARDON      Address:      [address] [place] [address] [address] [place] [id number]     Sex:Female     YOB: 1990     [phone]     Emergency Contact:[facility], CONTACT     MRN:[id number]     FIN:[id number]     Location:[facility]     Date of Service:03/27/2019      Primary Care Physician:       Rhianna Bryant MD, [phone]      Attending Physician:       Rhianna Bryant MD, [phone]  Problem List/Past Medical History    Ongoing     Biceps tendonitis     Bipolar     Subacromial impingement    Historical     Kidney stone     Pregnancy     Pregnancy  Procedure/Surgical History     Subacromial decompression (02/25/2019)      Comments: Right. including anterior acromioplasty..     Tubal sterilization (12/02/2011)     NVD (Normal Vaginal Delivery) (08/25/2011)      Comments: Female - 39w1d..     Extraction of wisdom tooth  Medications        triamcinolone 0.1% topical ointment: 1 jac, TOP, TID, for 7 day(s), PRN: for rash, 30 gm, 1 Refill(s).        lidocaine 5% topical ointment: 1 jac, TOP, TID, for 7 day(s), PRN: for itching, 35 gm, 1 Refill(s).        meloxicam 15 mg oral tablet: 15 mg, 1 tab(s), PO, Daily, PRN: for pain, 30 tab(s), 3 Refill(s).        Percocet 5/325 oral tablet: 1-2 tab(s), po, q6 hrs, PRN: as needed for pain, 24 tab(s), 0 Refill(s).        Percocet 10/325 oral tablet: 1 tab(s), po, bid, PRN: pain, 50 tab(s), 0 Refill(s).        Voltaren 1% topical gel:  2 gm, Topical, qid, not to exceed 8 grams/day/single joint of upper extremities, PRN: for pain, 100 gm, 1 Refill(s).        DULoxetine 60 mg oral delayed release capsule: 1 cap(s), Oral, daily, 90 cap(s).        divalproex sodium 500 mg oral tablet, extended release: 1 tab(s), Oral, daily, 90 tab(s).        zolpidem 5 mg oral tablet: 5 mg, 1 tab(s), Oral, hs, PRN: as needed for insomnia, 30 tab(s), 0 Refill(s).        Depakote  mg oral tablet, extended release: 750 mg, 3 tab(s), Oral, daily, 90 tab(s), 1 Refill(s).        QUEtiapine 25 mg oral tablet: 1/2 tab(s), Oral, qhs, 15 EA, 3 Refill(s).  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 Activity level: Moderate physical work., 12/19/2017     Home and Environment      Marital status: Single. Spouse/Partner name: shares custody with ex-partner. Lives with Children, Significant other. 3 children. Living situation: Home/Independent., 12/19/2017     Nutrition and Health      Type of diet: Regular., 05/27/2015     Sexual      Sexually active: Yes. Sexual orientation: Heterosexual., 05/27/2015     Tobacco      Former smoker, quit more than 30 days ago, 02/26/2018  Family History    Diabetes mellitus: Grandmother (M).    Heart disease: Grandmother (M).  Immunizations      Vaccine Date Status Comments      tetanus/diphth/pertuss (Tdap) adult/adol 08/26/2011 Recorded [facility]      influenza virus vaccine, inactivated 09/17/2009 Recorded      Td 07/26/2008 Recorded      rubella 09/13/2007 Recorded      Td 06/10/2004 Recorded      Hep B 11/10/1999 Recorded      Hep B 10/21/1998 Recorded      Hep B 06/17/1998 Recorded      varicella 05/08/1998 Recorded      Hep B 05/08/1998 Recorded      OPV 08/19/1993 Recorded      DTaP 08/19/1993 Recorded       MMR (measles/mumps/rubella) 08/19/1993 Recorded      Hib 04/01/1993 Recorded      OPV 04/23/1991 Recorded      DTaP 04/23/1991 Recorded      MMR (measles/mumps/rubella) 04/23/1991 Recorded      OPV 03/01/1989 Recorded      DTaP 03/01/1989 Recorded      OPV 1988 Recorded      DTaP 1988 Recorded      OPV 1988 Recorded      DTaP 1988 Recorded

## 2022-02-15 NOTE — TELEPHONE ENCOUNTER
Entered by Serina Olmstead MA on April 21, 2020 10:35:23 AM CDT  ---------------------  From: Serina Olmstead MA   To: [facility] #[id number]    Sent: 4/21/2020 10:35:23 AM CDT  Subject: Medication Management     ** Not Approved: Patient has requested refill too soon, refilled 12/20/19 for #270 w/ 1 refill **  divalproex sodium (DIVALPROEX EXTENDED RELEASE 250MG T)  TAKE 3 TABLETS BY MOUTH DAILY  Qty:  90 tab(s)        Days Supply:  30        Refills:  0          Substitutions Allowed     Route To Pharmacy - [facility] #[id number]   Signed by Serina Olmstead MA            ------------------------------------------  From: [facility] #[id number]  To: Rhianna Bryant MD  Sent: April 19, 2020 7:06:22 PM CDT  Subject: Medication Management  Due: April 20, 2020 7:06:22 PM CDT    ** On Hold Pending Signature **  Drug: divalproex sodium (divalproex sodium 250 mg oral tablet, extended release)  TAKE 3 TABLETS BY MOUTH DAILY  Quantity: 90 tab(s)  Days Supply: 30  Refills: 0  Substitutions Allowed  Notes from Pharmacy:     Dispensed Drug: divalproex sodium (divalproex sodium 250 mg oral tablet, extended release)  TAKE 3 TABLETS BY MOUTH DAILY  Quantity: 90 tab(s)  Days Supply: 30  Refills: 0  Substitutions Allowed  Notes from Pharmacy:   ------------------------------------------

## 2022-02-15 NOTE — PROGRESS NOTES
Subjective      pt here to follow up right shoulder pain, seeing PT, was able to contact PT to get copy of most recent note, reached out to PT and asked if they can do a workability functional assessment, [facility] reports they can.  pt has continues ant shoulder pain worse with repetitive motion, but the lateral shoulder pain has improved a lot.  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   Review of Systems       no fevers, chills, sore throat, runny nose, nausea, vomiting, constipation, rash or new skin lesions, chest pain, palpitations, slurred speech, new paresthesia, shortness of breath or wheeze.  Objective   Vitals & Measurements    T: 98.2   F (Tympanic)  HR: 68(Peripheral)  BP: 88/50  SpO2: 97%  WT: 159.2 lb    Physical Exam       Review of systems is negative except as per HPI including:  no fevers, chills, sore throat, runny nose, nausea, vomiting, constipation, diarrhea, rash or new skin lesions, chest pain, palpitations, slurred speech, new paresthesia, shortness of breath or wheeze.       Exam:       General: alert and oriented ×3 no acute distress.       HEENT: pupils are equal round and reactive to light extraocular motion is intact. Normocephalic and atraumatic.        Hearing is grossly normal and there is no otorrhea.        Nares are patent there is no rhinorrhea.        Mucous membranes are moist and pink.       Chest: has bilateral rise with no increased work of breathing.       Cardiovascular: normal perfusion and brisk capillary refill.       Musculoskeletal: no gross focal abnormalities and normal gait.  tender to palpation at right short head of the biceps tendon groove,   neg empty can and neg dropped arm       Neuro: no gross focal abnormalities and memory seems intact.       Psychiatric: speech is clear and coherent and fluent. Patient dressed appropriately for the weather. Mood is appropriate and affect is  full.           Lab Results        Lab Results (Last 4 results within 90 days)         Sodium Level: 141 mmol/L [135 mmol/L - 146 mmol/L] (09/11/18 12:53:00 CDT)        Potassium Level: 3.8 mmol/L [3.5 mmol/L - 5.3 mmol/L] (09/11/18 12:53:00 CDT)        Chloride Level: 108 mmol/L [98 mmol/L - 110 mmol/L] (09/11/18 12:53:00 CDT)        CO2 Level: 27 mmol/L [20 mmol/L - 32 mmol/L] (09/11/18 12:53:00 CDT)        Glucose Level: 71 mg/dL [65 mg/dL - 99 mg/dL] (09/11/18 12:53:00 CDT)        BUN: 12 mg/dL [7 mg/dL - 25 mg/dL] (09/11/18 12:53:00 CDT)        Creatinine Level: 0.71 mg/dL [0.5 mg/dL - 1.1 mg/dL] (09/11/18 12:53:00 CDT)        BUN/Creat Ratio: NOT APPLICABLE [6  - 22] (09/11/18 12:53:00 CDT)        eGFR: 114 mL/min/1.73m2 [8.6 mg/dL - 10.2 mg/dL] (09/11/18 12:53:00 CDT)        eGFR : 132 mL/min/1.73m2 [6  - 22] (09/11/18 12:53:00 CDT)        Calcium Level: 8.7 mg/dL [8.6 mg/dL - 10.2 mg/dL] (09/11/18 12:53:00 CDT)       Assessment/Plan       Biceps tendonitis (M75.21)          pt will rest the shoulder for the next 48 hours, she will get a copy of her job description to share with PT, they will do a workability assessment and fthen provide recommendations and make sure patient is strong enough to return to work with correct recommendations         Orders:          Physical Therapy Consult (Request), Referred to: [facility], Biceps tendonitis  Subacromial impingement                Subacromial impingement (M75.40)          improved with injection, icing, nsaids and pt and work modicfications         Orders:          Physical Therapy Consult (Request), Referred to: [facility], Biceps tendonitis  Subacromial impingement                Orders:         Return to Clinic (Request), RFV: Follow up mood, Return in 2 weeks      Fifteen minutes spent with patient in direct face to face contact, in addition to the time spent performing the procedure today, greater than 50% of that time was  spent  counseling and coordinating care.

## 2022-02-15 NOTE — PROGRESS NOTES
Chief Complaint    Patient presents to discuss anxiety and bipolar.  History of Present Illness      patient present to clinic today for mood      stuggles with depression and has a hard time with her spending, was previously diagnosed with bipolar disorder, endorses hx of vinod as well as depression      PHQ9=15, GAD7=20      no auditory or visual  hallucinations      no homicidal or suicidal ideation      is not currently seeing a counselor, but reports her boyfriend has counseling available through his insurance so they are planning to try this together, she will let me know if she would like a referral      current meds for mood:  none      past medications for mood: lamictal and zoloft, well tolerated but ineffective      previous mental health inpatient hospitalizations: none      thrill-seeking behavior: spending money, needs a second full time job to pay this off, has deleted apps from her phone to help control this, has moved into her boyfriends to decrease cost of rent,  has a debit card but no credit cards, neg sexual addiction, neg gambling problems      illicit drugs of abuse:no      social support:  boyfriend      current employment: works 2 full time jobs      additional life stressors: custody ross with father of her youngest child, father of her older 21 kids committed suicide      Review of systems is negative except as per HPI including:  no fevers, chills, sore throat, runny nose, nausea, vomiting, constipation, diarrhea, rash or new skin lesions, chest pain, palpitations, slurred speech, new paresthesia, shortness of breath or wheeze.      Exam:      General: alert and oriented ×3 no acute distress.      HEENT: pupils are equal round and reactive to light extraocular motion is intact. Normocephalic and atraumatic.       Hearing is grossly normal and there is no otorrhea.       Nares are patent there is no rhinorrhea.       Mucous membranes are moist and pink.      Chest: has bilateral rise  with no increased work of breathing.      Cardiovascular: normal perfusion and brisk capillary refill.      Musculoskeletal: no gross focal abnormalities and normal gait.      Neuro: no gross focal abnormalities and memory seems intact.      Psychiatric: speech is clear and coherent and fluent. Patient dressed appropriately for the weather. Mood is anxious and sad and affect is sad, anxious.                       Discussed with patient to return to clinic if symptoms worsen or do not improve  Physical Exam   Vitals & Measurements    T: 98.0   F (Tympanic)  HR: 54(Peripheral)  BP: 102/64  SpO2: 99%     HT: 66 in  WT: 158 lb   Assessment/Plan       Bipolar (F31.9)        25 minutes spent with patient in direct face to face contact, > 50% of time spent counseling and coordinating care.          Orders:          divalproex sodium, See Instructions, Instructions: 1 tab(s) Oral daily x 3 days then increase to 2 po Qday x 3 days, then increase to 3 po qday, # 90 EA, 1 Refill(s), Type: Maintenance, Pharmacy: [facility] [id number], 1 tab(s) Oral daily x 3 days then increase to..., (Ordered)          Return to Clinic (Request), RFV: follow up mood, Return in 1 week           Patient Information     Name:MATHEUS REARDON      Address:      [address] [place] [id number]     Sex:Female     YOB: 1990     [phone]     Emergency Contact:[facility]     MRN:[id number]     FIN:[id number]     Location:[facility]     Date of Service:08/01/2018      Primary Care Physician:       Selam Mcghee MD, [phone]      Attending Physician:       Rhianna Bryant MD, [phone]  Problem List/Past Medical History    Ongoing     Bipolar    Historical     Kidney stone     Pregnancy     Pregnancy  Procedure/Surgical History     Tubal sterilization (12/02/2011)           NVD (Normal Vaginal Delivery) (08/25/2011)            Comments:      Female -  39w1d.     Extraction of wisdom tooth        Medications     ibuprofen 800 mg oral tablet: 800 mg, 1 tab(s), po, prn, 0 Refill(s).     Depakote  mg oral tablet, extended release: See Instructions, 1 tab(s) Oral daily x 3 days then increase to 2 po Qday x 3 days, then increase to 3 po qday, 90 EA, 1 Refill(s).          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 Activity level: Moderate physical work., 12/19/2017     Exercise and Physical Activity - Occasional exercise, 05/27/2015     Home and Environment      Marital status: Single. Spouse/Partner name: shares custody with ex-partner. Lives with Children, Significant other. 3 children. Living situation: Home/Independent., 12/19/2017     Nutrition and Health      Type of diet: Regular., 05/27/2015     Sexual      Sexually active: Yes. Sexual orientation: Heterosexual., 05/27/2015     Substance Abuse - Denies Substance Abuse, 01/19/2011     Tobacco      Former smoker, quit more than 30 days ago, 02/26/2018  Family History    Diabetes mellitus: Grandmother (M).    Heart disease: Grandmother (M).  Immunizations      Vaccine Date Status Comments      tetanus/diphth/pertuss (Tdap) adult/adol 08/26/2011 Recorded [facility]

## 2022-02-15 NOTE — TELEPHONE ENCOUNTER
---------------------  From: Ally Miner LPN   To: [employer] Message Pool ([id number]);     Sent: 2/4/2019 2:55:24 PM CST  Subject: paperwork      PCP:   Felice CHENEY     Time of Call:  _ 1430       Person Calling:  _ pt  Phone number:  _ [phone]    Returned call at: _ 1453    Note:   _ Pt LM stating she has MLA or FMLA paperwork that needs to be filled out by [facility].     called pt back at 1453 and told her to leave it at the  so [facility] can fill it out. advised we would call her when done and leave it at  for her.    FYI to look out for paperwork    Last office visit and reason:  _ 01----------------------  From: Rose Terry CMA ([employer] Message Pool ([id number]))   To: Rhianna Bryant MD;     Sent: 2/4/2019 3:02:13 PM CST  Subject: FW: paperwork      Please watch for paperwork, I will as well.please let patient know that I am sorry but I won't be in clinic this week and possibly next week.  She might have to schedule a follow up with another provider to get this done or see or call Dr. Wright to see if he can complete the paperwork.---------------------  From: Rhianna Bryant MD   To: [employer] Message Pool ([facility]); Phone Messages Pool ([facility]);     Sent: 2/4/2019 6:34:27 PM CST  Subject: FW: paperworkPt notified. She has appt with Dr. Wright next week and will see if he can fill it out for her.

## 2022-02-15 NOTE — PROGRESS NOTES
Patient:   MATHEUS REARDON            MRN: [id number]            FIN: [id number]               Age:   27 years     Sex:  Female     :  1990   Associated Diagnoses:   Preoperative clearance   Author:   Al Rivera MD      Chief Complaint   2018 9:14 AM CDT    preop--surgery 16 at [facility] w/ Dr. Robertson for T&A.        Preoperative Information   Indication for surgery:  ENMT disorder, recurrent tonsillitis.    Accompanied by:  No one.    Source of history:  Self, Medical record.           Review of Systems   Constitutional:  No fever, No chills, No sweats, No weakness, No fatigue.    Eye:  No recent visual problem.    Ear/Nose/Mouth/Throat:  No decreased hearing, No nasal congestion, No sore throat.    Respiratory:  No shortness of breath, No cough.    Cardiovascular:  Negative, No chest pain, No palpitations, No peripheral edema.    Gastrointestinal:  No nausea, No vomiting, No diarrhea, No constipation, No heartburn.    Genitourinary:  No dysuria, No change in urine stream.    Hematology/Lymphatics:  No bruising tendency, No bleeding tendency.    Endocrine:  No cold intolerance, No heat intolerance.    Immunologic:  Negative.    Musculoskeletal:  No back pain, No neck pain, No joint pain, No muscle pain.    Integumentary:  No rash, No dryness, No skin lesion.    Neurologic:  Alert and oriented X4, No headache.    Psychiatric:  No anxiety, No depression.       Health Status   Allergies:    Allergic Reactions (Selected)  Severity Not Documented  Biaxin (No reactions were documented)  Keflex (No reactions were documented)   Medications:  (Selected)   Documented Medications  Documented  ibuprofen 800 mg oral tablet: 1 tab(s) ( 800 mg ), po, prn, 0 Refill(s), Type: Maintenance   Problem list:    All Problems  Bipolar / SNOMED CT 683235679 / Confirmed  Resolved: Pregnancy / SNOMED CT 770568023  Resolved: Pregnancy / SNOMED CT 385119278  Resolved: Pregnancy / SNOMED CT 509438787  Resolved:  Kidney stone / SNOMED CT 996501002      Histories   Past Medical History:    Active  Bipolar (802192014)  Resolved  Kidney stone (397278464):  Resolved.  Pregnancy (573674977):  Resolved on 9/11/2007 at 17 years.  Pregnancy (999289185):  Resolved on 4/30/2009 at 18 years.   Family History:    Diabetes mellitus  Grandmother (M)  Heart disease  Grandmother (M)     Procedure history:    Tubal sterilization (SNOMED CT 898107618) on 12/2/2011 at 21 Years.  NVD (Normal Vaginal Delivery) (ICD-9-) on 8/25/2011 at 21 Years.  Comments:  8/31/2011 8:19 AM - Jo Ann Floyd  Female - 39w1d.  Extraction of wisdom tooth (SNOMED CT 044117263).   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  Activity level: Moderate physical work.      Home and Environment Assessment            Marital status: Single.  Spouse/Partner name: shares custody with ex-partner.  Lives with Children,               Significant other.  3 children.  Living situation: Home/Independent.      Nutrition and Health Assessment            Type of diet: Regular.      Exercise and Physical Activity Assessment: Occasional exercise      Sexual Assessment            Sexually active: Yes.  Sexual orientation: Heterosexual.       Has no history of anemia.  Has no history of DVT or pulmonary embolism.  Has no personal history of bleeding problems.   Has a personal or family history of anesthesia reactions:  hypotension  Patient does not have active tuberculosis.    S/he has not taken aspirin or aspirin containing products in the last week.     S/he has not taken Plavix (Clopidogrel) in the last 2 weeks.    S/he has not taken warfarin in the  past week.    S/he has not been on corticosteroids for more than 2 weeks recently.      S/he is not DNR before, during or after surgery.    Chest pain / SOB walking up 2 flights of steps:  no  Pain in neck or jaw:  no  CAD MI:  no  Afib:  no  Heart Failure:  no  Asthma  or Bronchitis:  no  Diabetes:  no  Seizure Disorder:  no  CKD:  no  Thyroid Disease:  no  Liver Disease:  no  CVA:  no         Physical Examination   Vital Signs   4/16/2018 9:14 AM CDT Temperature Tympanic 97.2 DegF  LOW    Peripheral Pulse Rate 52 bpm  LOW    Pulse Site Radial artery    HR Method Manual    Systolic Blood Pressure 90 mmHg    Diastolic Blood Pressure 72 mmHg    Mean Arterial Pressure 78 mmHg    BP Site Right arm    BP Method Manual      Measurements from flowsheet : Measurements   4/16/2018 9:14 AM CDT Height Measured - Standard 66 in    Weight Measured - Standard 167.6 lb    BSA 1.88 m2    Body Mass Index 27.05 kg/m2  HI      General:  Alert and oriented, No acute distress.    Eye:  Pupils are equal, round and reactive to light, Extraocular movements are intact, Normal conjunctiva.    HENT:  Normocephalic, Tympanic membranes are clear, Oral mucosa is moist, No pharyngeal erythema, No sinus tenderness.    Neck:  Supple, Non-tender, No carotid bruit, No lymphadenopathy, No thyromegaly.    Respiratory:  Lungs are clear to auscultation, Respirations are non-labored, Breath sounds are equal, No chest wall tenderness.    Cardiovascular:  Normal rate, Regular rhythm, No murmur, No gallop, Normal peripheral perfusion, No edema.    Gastrointestinal:  Soft, Non-tender, No organomegaly.    Musculoskeletal:  Normal range of motion, Normal strength, No swelling, No deformity.    Integumentary:  Warm, Dry, No rash.    Neurologic:  Alert, Oriented, No focal deficits.    Psychiatric:  Cooperative, Appropriate mood & affect.       Impression and Plan   Diagnosis     Preoperative clearance (FWQ20-MY Z01.818).     Condition:  Stable, very low risk for  cardiac or pulmonary complications.

## 2022-02-15 NOTE — TELEPHONE ENCOUNTER
Entered by Rosana Palacios on October 06, 2020 10:02:20 AM CDT  Last filled 9/2/20 #30     Please advise on fill       ------------------------------------------  From: [facility] #[id number]  To: Rhianna Bryant MD  Sent: October 3, 2020 2:06:14 PM CDT  Subject: Medication Management  Due: October 2, 2020 11:09:35 PM CDT     ** On Hold Pending Signature **     Dispensed Drug: divalproex sodium (divalproex sodium 500 mg oral tablet, extended release), TAKE 1 TABLET BY MOUTH DAILY  Quantity: 30 tab(s)  Days Supply: 30  Refills: 0  Substitutions Allowed  Notes from Pharmacy:  ---------------------------------------------------------------  From: Rosana Palacios ([facility] Pool ([id number]_[facility]))   To: [facility] Message Pool ([facility]_[place] [facility] [place]);     Sent: 10/6/2020 10:02:26 AM CDT  Subject: FW: Medication Management   Due Date/Time: 10/4/2020 2:06:00 PM CDT---------------------  From: Rose Walsh CMA   To: [facility] #[id number]    Sent: 10/6/2020 10:11:04 AM CDT  Subject: FW: Medication Management     ** Submitted: **  Order:divalproex sodium (divalproex sodium 500 mg oral tablet, extended release)  1 tab(s)  Oral  daily  *NEED APPT FOR FURTHER REFILLS*  Qty:  14 tab(s)        Refills:  0          Substitutions Allowed     Route To Pharmacy - [facility] #[id number]    Signed by Rose Walsh CMA  10/6/2020 3:10:00 PM [facility]    ** Submitted: **  Complete:divalproex sodium (divalproex sodium 500 mg oral tablet, extended release)   Signed by Rose Walsh CMA  10/6/2020 3:11:00 PM [facility]    ** Not Approved:  **  divalproex sodium (DIVALPROEX EXTENDED RELEASE 500MG T)  TAKE 1 TABLET BY MOUTH DAILY  Qty:  30 tab(s)        Days Supply:  30        Refills:  0          Substitutions Allowed     Route To Pharmacy - [facility] #[id number]   Signed by Bri Walsh CMA at 1011-    Patient PAST DUE FOR APPT. 2 week supply sent, already given 1 mo protocol- video or in clinic appt ok.

## 2022-02-15 NOTE — PROGRESS NOTES
Chief Complaint    Follow up mood  History of Present Illness      patient present to clinic today for follow up of her mood.      PHQ9=   15 to 5 to now 3      MARY 7= 20 to 3 now 3      feels good, having a hard time sleeping at night and feeling drowsy all day.  takes both the duloxetine and the depakote qhs      feels less irritable, thinks the medication is helping, family has noticed and thinks she is better too.       Review of systems is negative except as per HPI including:  no fevers, chills, sore throat, runny nose, nausea, vomiting, constipation, diarrhea, rash or new skin lesions, chest pain, palpitations, slurred speech, new paresthesia, shortness of breath or wheeze.      Exam:      Vitals & Measurements      T: 98.1   F (Tympanic)  HR: 77(Peripheral)  BP: 98/54  SpO2: 98%       HT: 66 in  WT: 158.6 lb       General: alert and oriented ×3 no acute distress.      HEENT: pupils are equal round and reactive to light extraocular motion is intact. Normocephalic and atraumatic.       Hearing is grossly normal and there is no otorrhea.       Nares are patent there is no rhinorrhea.       Mucous membranes are moist and pink.      Chest: has bilateral rise with no increased work of breathing.      Cardiovascular: normal perfusion and brisk capillary refill.      Musculoskeletal: no gross focal abnormalities and normal gait.      Neuro: no gross focal abnormalities and memory seems intact.      Psychiatric: speech is clear and coherent and fluent. Patient dressed appropriately for the weather. Mood is appropriate and affect is full.                     Discussed with patient to return to clinic if symptoms worsen or do not improve  Assessment/Plan       Bipolar (F31.9)         cont with the depakote and duloxetine, try taking in am to see if this helps sleep at night and be less sedated during the day, may rtc sooner if needed otherwise 6 months         Orders:          DULoxetine, See Instructions,  Instructions: 1 cap(s) Oral qday x 1 week then increase to 2 PO qday, # 60 EA, 1 Refill(s), Type: Hard Stop, Pharmacy: [id number], (Completed)          [id number] office outpatient visit 25 minutes (Charge), Quantity: 1, Bipolar                Orders:         DULoxetine, = 1 cap(s) ( 60 mg ), Oral, daily, # 90 cap(s), 1 Refill(s), Type: Maintenance, Pharmacy: [id number], 1 cap(s) Oral daily, (Ordered)      25 minutes spent with patient in direct face to face contact, > 50% of time spent counseling and coordinating care.   Patient Information     Name:MATHEUS REARDON      Address:      [address] [place] [address]     Sex:Female     YOB: 1990     [phone]     Emergency Contact:[facility] CONTACT     MRN:[id number]     FIN:[id number]     Location:[facility]     Date of Service:09/11/2018      Primary Care Physician:       Litzy ANGEL [facility], [phone]      Attending Physician:       Rhianna Bryant MD, [phone]  Problem List/Past Medical History    Ongoing     Bipolar    Historical     Kidney stone     Pregnancy     Pregnancy  Procedure/Surgical History     Tubal sterilization (12/02/2011)           NVD (Normal Vaginal Delivery) (08/25/2011)            Comments:      Female - 39w1d.     Extraction of wisdom tooth        Medications     triamcinolone 0.1% topical ointment: 1 jac, TOP, TID, for 7 day(s), PRN: for rash, 30 gm, 1 Refill(s).     lidocaine 5% topical ointment: 1 jac, TOP, TID, for 7 day(s), PRN: for itching, 35 gm, 1 Refill(s).     Depakote  mg oral tablet, extended release: 500 mg, 1 tab(s), PO, Daily, 90 tab(s), 1 Refill(s).     meloxicam 15 mg oral tablet: 15 mg, 1 tab(s), PO, Daily, PRN: for pain, 30 tab(s), 3 Refill(s).     DULoxetine 60 mg oral delayed release capsule: 60 mg, 1 cap(s), Oral, daily, 90 cap(s), 1 Refill(s).          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 Activity level: Moderate physical work., 12/19/2017     Home and Environment      Marital status: Single. Spouse/Partner name: shares custody with ex-partner. Lives with Children, Significant other. 3 children. Living situation: Home/Independent., 12/19/2017     Nutrition and Health      Type of diet: Regular., 05/27/2015     Sexual      Sexually active: Yes. Sexual orientation: Heterosexual., 05/27/2015     Tobacco      Former smoker, quit more than 30 days ago, 02/26/2018  Family History    Diabetes mellitus: Grandmother (M).    Heart disease: Grandmother (M).  Immunizations      Vaccine Date Status Comments      tetanus/diphth/pertuss (Tdap) adult/adol 08/26/2011 Recorded [facility]      influenza virus vaccine, inactivated 09/17/2009 Recorded      Td 07/26/2008 Recorded      rubella 09/13/2007 Recorded      Td 06/10/2004 Recorded      Hep B 11/10/1999 Recorded      Hep B 10/21/1998 Recorded      Hep B 06/17/1998 Recorded      Hep B 05/08/1998 Recorded      varicella 05/08/1998 Recorded      MMR (measles/mumps/rubella) 08/19/1993 Recorded      OPV 08/19/1993 Recorded      DTaP 08/19/1993 Recorded      Hib 04/01/1993 Recorded      MMR (measles/mumps/rubella) 04/23/1991 Recorded      OPV 04/23/1991 Recorded      DTaP 04/23/1991 Recorded      OPV 03/01/1989 Recorded      DTaP 03/01/1989 Recorded      OPV 1988 Recorded      DTaP 1988 Recorded      OPV 1988 Recorded      DTaP 1988 Recorded

## 2022-02-15 NOTE — NURSING NOTE
Comprehensive Intake Entered On:  3/12/2019 3:15 PM CDT    Performed On:  3/12/2019 3:10 PM CDT by Ally Miner LPN               Summary   Chief Complaint :   med check. cant sleep started after shoulder surgery   Menstrual Status :   Menarcheal   Weight Measured :   170.6 lb(Converted to: 170 lb 10 oz, 77.38 kg)    Systolic Blood Pressure :   90 mmHg   Diastolic Blood Pressure :   60 mmHg   Mean Arterial Pressure :   70 mmHg   Peripheral Pulse Rate :   69 bpm   BP Site :   Right arm   BP Method :   Manual   HR Method :   Electronic   Oxygen Saturation :   98 %   Ally Miner LPN - 3/12/2019 3:10 PM CDT   Health Status   Allergies Verified? :   Yes   Medication History Verified? :   Yes   Immunizations Current :   Yes   Medical History Verified? :   Yes   Pre-Visit Planning Status :   Completed   Tobacco Use? :   Never smoker   Ally Miner LPN - 3/12/2019 3:10 PM CDT   Consents   Consent for Immunization Exchange :   Consent Granted   Consent for Immunizations to Providers :   Consent Granted   Ally Miner LPN 3/12/2019 3:10 PM CDT   Meds / Allergies   (As Of: 3/12/2019 3:15:11 PM CDT)   Allergies (Active)   Biaxin  Estimated Onset Date:   Unspecified ; Created By:   Yadi Osborn LPN; Reaction Status:   Active ; Substance:   Biaxin ; Updated By:   Yadi Osborn LPN; Reviewed Date:   3/12/2019 3:12 PM CDT      Keflex  Estimated Onset Date:   Unspecified ; Created By:   Yadi Osborn LPN; Reaction Status:   Active ; Category:   Drug ; Substance:   Keflex ; Type:   Allergy ; Updated By:   Yadi Osborn LPN; Reviewed Date:   3/12/2019 3:12 PM CDT        Medication List   (As Of: 3/12/2019 3:15:11 PM CDT)   Prescription/Discharge Order    acetaminophen-oxycodone  :   acetaminophen-oxycodone ; Status:   Prescribed ; Ordered As Mnemonic:   Percocet 10/325 oral tablet ; Simple Display Line:   1 tab(s), po, bid, PRN: pain, 50 tab(s), 0 Refill(s) ; Ordering Provider:   Rhianna Bryant MD; Catalog Code:    acetaminophen-oxycodone ; Order Dt/Tm:   2/1/2019 1:17:36 PM          acetaminophen-oxycodone  :   acetaminophen-oxycodone ; Status:   Prescribed ; Ordered As Mnemonic:   Percocet 5/325 oral tablet ; Simple Display Line:   1-2 tab(s), po, q6 hrs, PRN: as needed for pain, 24 tab(s), 0 Refill(s) ; Ordering Provider:   Rhianna Bryant MD; Catalog Code:   acetaminophen-oxycodone ; Order Dt/Tm:   1/21/2019 10:29:40 AM          diclofenac topical  :   diclofenac topical ; Status:   Prescribed ; Ordered As Mnemonic:   Voltaren 1% topical gel ; Simple Display Line:   2 gm, Topical, qid, not to exceed 8 grams/day/single joint of upper extremities, PRN: for pain, 100 gm, 1 Refill(s) ; Ordering Provider:   Rhianna Bryant MD; Catalog Code:   diclofenac topical ; Order Dt/Tm:   2/1/2019 1:19:14 PM          divalproex sodium 500 mg oral tablet, extended release  :   divalproex sodium 500 mg oral tablet, extended release ; Status:   Prescribed ; Ordered As Mnemonic:   divalproex sodium 500 mg oral tablet, extended release ; Simple Display Line:   1 tab(s), Oral, daily, 90 tab(s) ; Ordering Provider:   Rhianna Bryant MD; Catalog Code:   divalproex sodium ; Order Dt/Tm:   3/8/2019 10:00:59 AM          DULoxetine 60 mg oral delayed release capsule  :   DULoxetine 60 mg oral delayed release capsule ; Status:   Prescribed ; Ordered As Mnemonic:   DULoxetine 60 mg oral delayed release capsule ; Simple Display Line:   1 cap(s), Oral, daily, 90 cap(s) ; Ordering Provider:   Rhinana Bryant MD; Catalog Code:   DULoxetine ; Order Dt/Tm:   3/8/2019 10:00:53 AM          lidocaine topical  :   lidocaine topical ; Status:   Prescribed ; Ordered As Mnemonic:   lidocaine 5% topical ointment ; Simple Display Line:   1 jac, TOP, TID, for 7 day(s), PRN: for itching, 35 gm, 1 Refill(s) ; Ordering Provider:   Rhianna Bryant MD; Catalog Code:   lidocaine topical ; Order Dt/Tm:   8/8/2018 9:17:58 AM          meloxicam  :   meloxicam ;  Status:   Prescribed ; Ordered As Mnemonic:   meloxicam 15 mg oral tablet ; Simple Display Line:   15 mg, 1 tab(s), PO, Daily, PRN: for pain, 30 tab(s), 3 Refill(s) ; Ordering Provider:   Rhianna Bryant MD; Catalog Code:   meloxicam ; Order Dt/Tm:   9/11/2018 2:51:01 PM          triamcinolone topical  :   triamcinolone topical ; Status:   Prescribed ; Ordered As Mnemonic:   triamcinolone 0.1% topical ointment ; Simple Display Line:   1 jac, TOP, TID, for 7 day(s), PRN: for rash, 30 gm, 1 Refill(s) ; Ordering Provider:   Rhianna Bryant MD; Catalog Code:   triamcinolone topical ; Order Dt/Tm:   8/8/2018 9:17:45 AM

## 2022-02-15 NOTE — PROGRESS NOTES
Patient:   MATHEUS REARDON            MRN: [id number]            FIN: [id number]               Age:   29 years     Sex:  Female     :  1990   Associated Diagnoses:   None   Author:   Rhianna Bryant MD      Visit Information      Date of Service: 2019 09:48 am  Performing Location: [facility]  Encounter#: [id number]      Primary Care Provider (PCP):  Rhianna Bryant MD    NPI# [id number]      Referring Provider:  Rhianna Bryant MD    NPI# [id number]      Chief Complaint   2019 10:00 AM CST  Med check. Pre-op. DOS 2020-[facility]-R.Shoulder-Dr. Wright.  (Modified)      History of Present Illness   Chief complaint and symptoms as noted above and confirmed with patient.      Shoulder pain: Ongoing problem and previous surgeries. Recently went into orthopedic urgent care and patient received a shot into the shoulder. This improved the pain. Then later got a shot in the biceps, this also improved pain. This was around  Went back in and Did MRI, was told there is a lot of scar tissue causing a frozen joint. /10 pain regularly. Has been using ice and ibuprofen with little relief. Patient has not been able to sleep due to pain. Has had Percocet in the past and tolerated.     Was fired from her job related to shoulder injury.     The plan is to do exploratory surgery/ scar removal. 2020 [facility]. After surgery will go to PT for 10 straight days.     Pre op: ASA category 2, No family history of malignant hyperthermia.      Review of Systems   All other systems are negative      Health Status   Allergies:    Allergic Reactions (Selected)  Severity Not Documented  Biaxin (No reactions were documented)  Keflex (No reactions were documented)   Medications:  (Selected)   Prescriptions  Prescribed  DULoxetine 60 mg oral delayed release capsule: 1 cap(s), Oral, daily, # 90 cap(s), Type: Soft Stop, Pharmacy: [facility] #[id number]  divalproex sodium 250 mg oral tablet, extended  release: 3 tab(s), Oral, daily, # 90 tab(s), Type: Soft Stop, Pharmacy: [facility] #[id number]  divalproex sodium 250 mg oral tablet, extended release: See Instructions, Instructions: TAKE 3 TABLETS BY MOUTH DAILY, # 90 tab(s), Type: Soft Stop, Pharmacy: [facility] #[id number], TAKE 3 TABLETS BY MOUTH DAILY,    Medications          *denotes recorded medication          DULoxetine 60 mg oral delayed release capsule: 1 cap(s), Oral, daily, 90 cap(s).          divalproex sodium 250 mg oral tablet, extended release: 3 tab(s), Oral, daily, 90 tab(s).          divalproex sodium 250 mg oral tablet, extended release: See Instructions, TAKE 3 TABLETS BY MOUTH DAILY, 90 tab(s).       Problem list:    All Problems  Bipolar / SNOMED CT 326138205 / Confirmed  Biceps tendonitis / SNOMED CT 320883787 / Confirmed  Subacromial impingement / SNOMED CT 897509084 / Confirmed  Resolved: Kidney stone / SNOMED CT 969066262  Resolved: Pregnancy / SNOMED CT 007764203  Resolved: Pregnancy / SNOMED CT 919661363  Resolved: Pregnancy / SNOMED CT 194371848      Histories   Past Medical History:    Active  Bipolar (198235036)  Resolved  Kidney stone (030274896):  Resolved.  Pregnancy (069092081):  Resolved on 9/11/2007 at 17 years.  Pregnancy (096469530):  Resolved on 4/30/2009 at 18 years.   Family History:    Diabetes mellitus  Grandmother (M)  Heart disease  Grandmother (M)     Procedure history:    Subacromial decompression (027829050) on 2/25/2019 at 28 Years.  Comments:  2/28/2019 8:20 AM CST Jo Ann Page  Right. including anterior acromioplasty.  Tonsillectomy and adenoidectomy (544792251) on 4/23/2018 at 27 Years.  Tubal sterilization (758198787) on 12/2/2011 at 21 Years.  NVD (Normal Vaginal Delivery) (650) on 8/25/2011 at 21 Years.  Comments:  8/31/2011 8:19 AM CDT - Jo Ann Floyd  Female - 39w1d.  Extraction of wisdom tooth (467422766).   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  Activity level: Moderate physical work.      Home and Environment Assessment            Marital status: Single.  Spouse/Partner name: shares custody with ex-partner.  Lives with Children,               Significant other.  3 children.  Living situation: Home/Independent.      Nutrition and Health Assessment            Type of diet: Regular.      Sexual Assessment            Sexually active: Yes.  Sexual orientation: Heterosexual.        Physical Examination   Vital Signs   12/19/2019 10:00 AM CST Temperature Tympanic 97.5 DegF  LOW     Peripheral Pulse Rate 90 bpm     HR Method Electronic     Respiratory Rate In Error br/min (In Error)    Systolic Blood Pressure 114 mmHg     Diastolic Blood Pressure 64 mmHg     Mean Arterial Pressure 81 mmHg     BP Site Right arm     BP Method Manual       Measurements from flowsheet : Measurements   12/19/2019 10:00 AM CST  Weight Measured - Standard                162.4 lb     Vital signs as noted above   General:  Alert and oriented.    Eye:  Pupils are equal, round and reactive to light, Extraocular movements are intact.    HENT:  Tympanic membranes are clear, Oral mucosa is moist, No pharyngeal erythema.    Neck:  No lymphadenopathy, Few anterior nodes..    Respiratory:  Lungs clear to auscultation bilaterally.  Equal air entry.  Symmetrical chest expansion.  No wheezing.  .    Cardiovascular:  S1 and S2 with regular rate and rhythm.  No murmurs.  Pulses 2+ in all four extremities.  Brisk capillary refill.  .    Gastrointestinal:  Positive bowel sounds in all four quadrants.  Abdomen is soft, non-distended, non-tender.  No hepatosplenomegaly.  .    Neurologic:  Normal deep tendon reflexes.    Psychiatric:  Appropriate mood & affect.       Health  Maintenance   MARY-7: 3 Not difficult at all   PHQ-9: 3/27      Impression and Plan   Plan:  Labs done today.  Okay to take routine medications day before surgery.   Percocet to help with pain at bedtime to allow her adequate pain control so pt can sleep   Advised Benadryl to prevent pruritus from narcotic and stool softener to prevent constipation.  Tylenol around the clock for pain.  Advised [place] cup ice massages.  Influenza vacine done today.    ASA 2 no contraindication for surgery and nohx of coagulpathy .       Professional Services   I, Ally Miner LPN, acted solely as a scribe for, and in the presence of Dr. Rhianna Bryant who performed the services.

## 2022-02-15 NOTE — NURSING NOTE
CAGE Assessment Entered On:  3/13/2019 10:08 AM CDT    Performed On:  3/12/2019 10:07 AM CDT by Melanie Mccormick               Assessment   Have you ever felt you should cut down on your drinking :   No   Have people annoyed you by criticizing your drinking :   No   Have you ever felt bad or guilty about your drinking :   No   Have you ever taken a drink first thing in the morning to steady your nerves or get rid of a hangover (Eye-opener) :   No   CAGE Score :   0    Melanie Mccormick - 3/13/2019 10:07 AM CDT

## 2022-02-15 NOTE — TELEPHONE ENCOUNTER
---------------------  From: Nusrat Crum   To: Efraín Bryant MDica;     Sent: 1/17/2020 10:15:52 AM CST  Subject: Scheduling Management     No showed for an appointment for fatigue.

## 2022-02-15 NOTE — LETTER
(Inserted Image. Unable to display)   May 14, 2021    MATHEUS REARDON  [address] [place] [id number]            Dear MATHEUS,      Thank you for selecting [facility] for your healthcare needs.    Our records indicate you are due for the following services:     Follow-up office visit.    (FYI   Regarding office visits: In some instances, a video visit or telephone visit may be offered as an option.)      To schedule an appointment or if you have further questions, please contact your clinic at [phone].      Powered by [employer]    Sincerely,    Rhianna Bryant MD

## 2022-02-15 NOTE — NURSING NOTE
Depression Screening Entered On:  3/13/2019 10:08 AM CDT    Performed On:  3/12/2019 10:07 AM CDT by Melanie Mccormick               Depression Screening   Feeling Down, Depressed, Hopeless :   Not at all   Little Interest - Pleasure in Activities :   Not at all   Initial Depression Screen Score :   0    Trouble Falling or Staying Asleep :   More than half the days   Feeling Tired or Little Energy :   Several days   Poor Appetite or Overeating :   Not at all   Feeling Bad About Yourself :   Not at all   Trouble Concentrating :   Not at all   Moving or Speaking Slowly :   Not at all   Thoughts Better Off Dead or Hurting Self :   Not at all   Detailed Depression Screen Score :   3    Total Depression Screen Score :   3    MARY Difficulty with Work, Home, Others :   Somewhat difficult   Melanie Mccormick - 3/13/2019 10:07 AM CDT

## 2022-02-15 NOTE — TELEPHONE ENCOUNTER
Entered by Rose Terry CMA on February 13, 2019 10:05:31 AM CST  got it.       ---------------------  From: Rhianna Bryant MD   To: SHRAVAN Message Pool ([id number] - [place]);     Sent: 2/13/2019 10:00:48 AM CST  Subject: General Message     please get copy of MRA done of right shoulder in [facility]  11/20/2019 for her chart, thanks

## 2022-02-15 NOTE — LETTER
(Inserted Image. Unable to display)   January 20, 2021        MATHEUS REARDON  [address] [place] [id number]        Dear MATHEUS,      Thank you for selecting [facility] for your healthcare needs.    Our records indicate you are due for the following services:     Follow-up office visit     (FYI   Regarding office visits: In some instances, a video visit or telephone visit may be offered as an option.)        To schedule an appointment or if you have further questions, please contact your clinic at [phone].      Powered by [employer]    Sincerely,    Rhianna Bryant MD

## 2022-02-15 NOTE — PROGRESS NOTES
Patient:   MATHEUS REARDON            MRN: [id number]            FIN: [id number]               Age:   29 years     Sex:  Female     :  1990   Associated Diagnoses:   Chronic pain in shoulder   Author:   Oc Adhikari MD      Visit Information      Date of Service: 2020 10:34 am  Performing Location: [facility]  Encounter#: [id number]      Chief Complaint   2020 10:38 AM CST   Reqesting refill of pain meds.  Right shoulder Surgery on 18.        History of Present Illness   chief complaint and symptoms as noted above confirmed with patient   Ortho referred her to us for pain management  starting pt  out of percocet  Not requesting narcotic  ortho feels like all is ok         Review of Systems   Constitutional:  Negative except as documented in history of present illness.    Integumentary:  Negative.    Neurologic:  Negative.       Health Status   Allergies:    Allergic Reactions (Selected)  Severity Not Documented  Biaxin (No reactions were documented)  Keflex (No reactions were documented)   Medications:  (Selected)   Prescriptions  Prescribed  DULoxetine 60 mg oral delayed release capsule: = 1 cap(s), Oral, daily, # 90 cap(s), 1 Refill(s), Type: Soft Stop, Pharmacy: [email or web address] #[id number], 1 cap(s) Oral daily  Percocet 5/325 oral tablet: 1-2 tab(s), po, q6 hrs, PRN: as needed for pain, # 20 tab(s), 0 Refill(s), Type: Maintenance, [facility]acy: [facility] #[id number], 1-2 tab(s) Oral q6 hrs,PRN:as needed for pain  divalproex sodium 250 mg oral tablet, extended release: = 3 tab(s), Oral, daily, # 270 tab(s), 1 Refill(s), Type: Maintenance, Pharmacy: [facility] #[id number], 3 tab(s) Oral daily  gabapentin 100 mg oral capsule: = 1 cap(s) ( 100 mg ), Oral, tid, # 90 cap(s), 0 Refill(s), Type: Maintenance, Pharmacy: [email or web address] #[id number], 1 cap(s) Oral tid   Problem list:    All Problems (Selected)  Biceps tendonitis / SNOMED CT 231387197 / Confirmed  Bipolar /  SNOMED CT 911006129 / Confirmed  Subacromial impingement / SNOMED CT 976527884 / Confirmed      Histories   Past Medical History:    Active  Bipolar (178177567)  Resolved  Kidney stone (091529673):  Resolved.  Pregnancy (682367162):  Resolved on 9/11/2007 at 17 years.  Pregnancy (971648907):  Resolved on 4/30/2009 at 18 years.   Family History:    Diabetes mellitus  Grandmother (M)  Heart disease  Grandmother (M)     Procedure history:    Arthroscopy of shoulder - Right (SNOMED CT 389821451) performed by Shai Wright MD on 1/6/2020 at 29 Years.  Comments:  1/17/2020 10:48 AM CST - Jo Ann Floyd  Arthroscopic tenotomy of the long head biceps tendon.  Subacromial decompression (SNOMED CT 026290485) performed by Shai Wright MD on 2/25/2019 at 28 Years.  Comments:  2/28/2019 8:20 AM CST - Jo Ann Floyd  Right. including anterior acromioplasty.  Tonsillectomy and adenoidectomy (SNOMED CT 390974306) performed by Deloris Robertson MD on 4/23/2018 at 27 Years.  Tubal sterilization (SNOMED CT 375757437) performed by Jose E Long MD on 12/2/2011 at 21 Years.  NVD (Normal Vaginal Delivery) (ICD-9-) performed by Al Philip MD on 8/25/2011 at 21 Years.  Comments:  8/31/2011 8:19 AM CDT - Jo Ann Floyd  Female - 39w1d.  Extraction of wisdom tooth (SNOMED CT 047855300).   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  Activity level: Moderate physical work.      Home and Environment Assessment            Marital status: Single.  Spouse/Partner name: shares custody with ex-partner.  Lives with Children,               Significant other.  3 children.  Living situation: Home/Independent.      Nutrition and Health Assessment            Type of  diet: Regular.      Sexual Assessment            Sexually active: Yes.  Sexual orientation: Heterosexual.        Physical Examination   Vital Signs   2/21/2020 10:38 AM CST Temperature Tympanic 97.1 DegF  LOW    Peripheral Pulse Rate 56 bpm  LOW    HR Method Electronic    Systolic Blood Pressure 95 mmHg    Diastolic Blood Pressure 62 mmHg    Mean Arterial Pressure 73 mmHg    BP Method Electronic      Measurements from flowsheet : Measurements   2/21/2020 10:38 AM CST Height Measured - Standard 66 in    Weight Measured - Standard 183.8 lb    BSA 1.97 m2    Body Mass Index 29.66 kg/m2  HI      General:  Alert and oriented, No acute distress.    Musculoskeletal:       Upper extremity exam: Shoulder ( Right, Range of motion ( Diminished ) ).    Neurologic:  Alert, Oriented.       Impression and Plan   Diagnosis     Chronic pain in shoulder (UTZ71-JA M25.519).     Plan:  trial gabapentin call in 1 week to adjust dose  apap/nsaid   15 min spent Face-to-face   .    Patient Instructions:       Counseled: Patient, Regarding diagnosis, Regarding medications, Activity.

## 2022-02-15 NOTE — TELEPHONE ENCOUNTER
---------------------  From: Tracey Peraza CMA (eRx Pool ([id number] - [place]))   To: Appointment Pool ([id number]);     Sent: 3/1/2021 7:38:21 AM CST  Subject: Overdue appt     Please contact pt to schedule f/u appt with [facility] (per RTC, telemed is ok)left message for pt to call back x1LVM X2, message sent through portal.

## 2022-02-15 NOTE — NURSING NOTE
CAGE Assessment Entered On:  12/19/2019 3:15 PM CST    Performed On:  12/19/2019 3:14 PM CST by Serina Olmstead MA               Assessment   Have you ever felt you should cut down on your drinking :   No   Have people annoyed you by criticizing your drinking :   No   Have you ever felt bad or guilty about your drinking :   No   Have you ever taken a drink first thing in the morning to steady your nerves or get rid of a hangover (Eye-opener) :   No   CAGE Score :   0    Serina Olmstead MA - 12/19/2019 3:14 PM CST

## 2022-02-15 NOTE — PROGRESS NOTES
Chief Complaint    sore throat. tonsillectomy in april and has had continuous sore throats since then. Present for 2 days.  History of Present Illness      HPI pt presents to clinic today with sore throat, took a left over augmentin last night and feels alittle better,      No difficulty swallowing.      no fevers, chills, headache, nausea, abdominal pain      no runny nose, vomiting, diarrhea, constipation, rash or new skin lesions, chest pain, palpitations, slurred speech, new paresthesia, shortness of breath or wheeze.      Review of systems is negative except as per HPI      Exam:      General: alert and oriented ×3 no acute distress.      HEENT: Normocephalic and atraumatic.       Eyes pupils are equal round and reactive to light extraocular motion is intact.       Hearing is grossly normal and there is no otorrhea. Tympanic membranes are pearly grey with a normal light reflex.      Nares are patent there is no rhinorrhea.       Mouth: tonsils a little enlarged and red no petechia no exudate no posterior pharyngeal cobblestoning, mucous membranes are moist and pink.      Neck: is supple, there is mild anterior cervical tender lymphadenopathy, mobile.       Chest: has bilateral rise with no increased work of breathing. clear to auscultation without wheezes, rhonchi, or rales.      Cardiovascular: normal perfusion and brisk capillary refill. S1S2 with regular rate and rhythm and no murmurs, gallops or rubs.      Musculoskeletal: no gross focal abnormalities and normal gait.      Neuro: no gross focal abnormalities and memory seems intact.      Education:      Discussed with patient ok to use tylenol or motrin OTC prn pain, also can try warm tea or throat lozenges prn pain.  If the condition worsens or does not improve then should RTC for further evaluation.  Physical Exam   Vitals & Measurements    T: 97.2   F (Tympanic)  HR: 62(Peripheral)  BP: 90/58  SpO2: 99%     WT: 160.6 lb   Assessment/Plan       Sore  throat (J02.9)        Discussed with patient that as she has already taken an Augmentin I do not feel comfortable trying to rely on a strep throat culture done today to determine whether or not she has strep throat.  As she is already started a course of antibiotics and has no additional antibiotics at home I will provide her with a prescription for a course of antibiotics however of encouraged her in the future to come in and get a culture done so that we can determine if she actually needs the antibiotics.  Antibiotics to have the risks as well as benefits of they should not be used for viral sore throat.  I would not be surprised if her symptoms do not improve on the antibiotics as we do not know that this is in fact a bacterial pharyngitis.  Would like for her to return to clinic if her symptoms worsen or have not resolved with an 10 days.  She can also return to clinic after she completes her course of antibiotic to do a cultures to determine if she is a strep carrier.         Orders:          amoxicillin, = 1 cap(s) ( 500 mg ), PO, TID, # 30 cap(s), 0 Refill(s), Type: Maintenance, Pharmacy: [id number], 1 cap(s) Oral tid,x10 day(s), (Ordered)                Orders:         Referral (Request), 11/27/18 12:30:00 CST, Referred to: Orthopaedics, Referred to: Shai Wright, Biceps tendonitis  Subacromial impingement  Rotator cuff injury  Patient Information     Name:MATHEUS REARDON      Address:      [address] [place] [id number]     Sex:Female     YOB: 1990     [phone]     Emergency Contact:[facility], CONTACT     MRN:[id number]     FIN:[id number]     Location:[facility]     Date of Service:11/27/2018      Primary Care Physician:       Selam Mcghee MD, [phone]      Attending Physician:       Rhianna Bryant MD, [phone]  Problem List/Past Medical History    Ongoing     Biceps tendonitis     Bipolar      Subacromial impingement    Historical     Kidney stone     Pregnancy     Pregnancy  Procedure/Surgical History     Tubal sterilization (12/02/2011)           NVD (Normal Vaginal Delivery) (08/25/2011)            Comments:      Female - 39w1d.     Extraction of wisdom tooth        Medications     triamcinolone 0.1% topical ointment: 1 jac, TOP, TID, for 7 day(s), PRN: for rash, 30 gm, 1 Refill(s).     lidocaine 5% topical ointment: 1 jac, TOP, TID, for 7 day(s), PRN: for itching, 35 gm, 1 Refill(s).     Depakote  mg oral tablet, extended release: 500 mg, 1 tab(s), PO, Daily, 90 tab(s), 1 Refill(s).     meloxicam 15 mg oral tablet: 15 mg, 1 tab(s), PO, Daily, PRN: for pain, 30 tab(s), 3 Refill(s).     DULoxetine 60 mg oral delayed release capsule: 60 mg, 1 cap(s), Oral, daily, 90 cap(s), 1 Refill(s).     amoxicillin 500 mg oral capsule: 500 mg, 1 cap(s), PO, TID, for 10 day(s), 30 cap(s), 0 Refill(s).          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 Activity level: Moderate physical work., 12/19/2017     Home and Environment      Marital status: Single. Spouse/Partner name: shares custody with ex-partner. Lives with Children, Significant other. 3 children. Living situation: Home/Independent., 12/19/2017     Nutrition and Health      Type of diet: Regular., 05/27/2015     Sexual      Sexually active: Yes. Sexual orientation: Heterosexual., 05/27/2015     Tobacco      Former smoker, quit more than 30 days ago, 02/26/2018  Family History    Diabetes mellitus: Grandmother (M).    Heart disease: Grandmother (M).  Immunizations      Vaccine Date Status Comments      tetanus/diphth/pertuss (Tdap) adult/adol 08/26/2011 Recorded [facility]      influenza virus vaccine, inactivated  09/17/2009 Recorded      Td 07/26/2008 Recorded      rubella 09/13/2007 Recorded      Td 06/10/2004 Recorded      Hep B 11/10/1999 Recorded      Hep B 10/21/1998 Recorded      Hep B 06/17/1998 Recorded      Hep B 05/08/1998 Recorded      varicella 05/08/1998 Recorded      MMR (measles/mumps/rubella) 08/19/1993 Recorded      OPV 08/19/1993 Recorded      DTaP 08/19/1993 Recorded      Hib 04/01/1993 Recorded      MMR (measles/mumps/rubella) 04/23/1991 Recorded      OPV 04/23/1991 Recorded      DTaP 04/23/1991 Recorded      OPV 03/01/1989 Recorded      DTaP 03/01/1989 Recorded      OPV 1988 Recorded      DTaP 1988 Recorded      OPV 1988 Recorded      DTaP 1988 Recorded  Lab Results       Lab Results (Last 4 results within 90 days)        Sodium Level: 141 mmol/L [135 mmol/L - 146 mmol/L] (09/11/18 12:53:00 CDT)       Potassium Level: 3.8 mmol/L [3.5 mmol/L - 5.3 mmol/L] (09/11/18 12:53:00 CDT)       Chloride Level: 108 mmol/L [98 mmol/L - 110 mmol/L] (09/11/18 12:53:00 CDT)       CO2 Level: 27 mmol/L [20 mmol/L - 32 mmol/L] (09/11/18 12:53:00 CDT)       Glucose Level: 71 mg/dL [65 mg/dL - 99 mg/dL] (09/11/18 12:53:00 CDT)       BUN: 12 mg/dL [7 mg/dL - 25 mg/dL] (09/11/18 12:53:00 CDT)       Creatinine Level: 0.71 mg/dL [0.5 mg/dL - 1.1 mg/dL] (09/11/18 12:53:00 CDT)       BUN/Creat Ratio: NOT APPLICABLE [6  - 22] (09/11/18 12:53:00 CDT)       eGFR: 114 mL/min/1.73m2 [8.6 mg/dL - 10.2 mg/dL] (09/11/18 12:53:00 CDT)       eGFR African American: 132 mL/min/1.73m2 [6  - 22] (09/11/18 12:53:00 CDT)       Calcium Level: 8.7 mg/dL [8.6 mg/dL - 10.2 mg/dL] (09/11/18 12:53:00 CDT)

## 2022-02-15 NOTE — NURSING NOTE
Comprehensive Intake Entered On:  1/21/2019 9:45 AM CST    Performed On:  1/21/2019 9:40 AM CST by Arleen Lazcano               Summary   Chief Complaint :   Work comp f/u.    Menstrual Status :   Menarcheal   Weight Measured :   168 lb(Converted to: 168 lb 0 oz, 76.20 kg)    Height Measured :   66 in(Converted to: 5 ft 6 in, 167.64 cm)    Body Mass Index :   27.11 kg/m2 (HI)    Body Surface Area :   1.88 m2   Systolic Blood Pressure :   100 mmHg   Diastolic Blood Pressure :   58 mmHg (LOW)    Mean Arterial Pressure :   72 mmHg   Peripheral Pulse Rate :   66 bpm   Temperature Tympanic :   96.5 DegF(Converted to: 35.8 DegC)  (LOW)    Arleen Lazcano - 1/21/2019 9:40 AM CST   Health Status   Allergies Verified? :   Yes   Medication History Verified? :   Yes   Immunizations Current :   Yes   Medical History Verified? :   Yes   Pre-Visit Planning Status :   Completed   Tobacco Use? :   Former smoker   Arleen Lazcano - 1/21/2019 9:40 AM CST   Consents   Consent for Immunization Exchange :   Consent Granted   Consent for Immunizations to Providers :   Consent Granted   Arleen Lazcano - 1/21/2019 9:40 AM CST   Meds / Allergies   (As Of: 1/21/2019 9:45:23 AM CST)   Allergies (Active)   Biaxin  Estimated Onset Date:   Unspecified ; Created By:   Yadi Osborn LPN; Reaction Status:   Active ; Substance:   Biaxin ; Updated By:   Yadi Osborn LPN; Reviewed Date:   10/9/2018 9:57 AM CDT      Keflex  Estimated Onset Date:   Unspecified ; Created By:   Yadi Osborn LPN; Reaction Status:   Active ; Category:   Drug ; Substance:   Keflex ; Type:   Allergy ; Updated By:   Yadi Osborn LPN; Reviewed Date:   10/9/2018 9:57 AM CDT        Medication List   (As Of: 1/21/2019 9:45:23 AM CST)   Prescription/Discharge Order    amoxicillin  :   amoxicillin ; Status:   Processing ; Ordered As Mnemonic:   amoxicillin 500 mg oral capsule ; Ordering Provider:   Rhianna Bryant MD; Action Display:    Complete ; Catalog Code:   amoxicillin ; Order Dt/Tm:   1/21/2019 9:43:38 AM          DULoxetine  :   DULoxetine ; Status:   Prescribed ; Ordered As Mnemonic:   DULoxetine 60 mg oral delayed release capsule ; Simple Display Line:   60 mg, 1 cap(s), Oral, daily, 90 cap(s), 1 Refill(s) ; Ordering Provider:   Rhianna Bryant MD; Catalog Code:   DULoxetine ; Order Dt/Tm:   9/11/2018 2:58:27 PM          meloxicam  :   meloxicam ; Status:   Prescribed ; Ordered As Mnemonic:   meloxicam 15 mg oral tablet ; Simple Display Line:   15 mg, 1 tab(s), PO, Daily, PRN: for pain, 30 tab(s), 3 Refill(s) ; Ordering Provider:   Rhianna Bryant MD; Catalog Code:   meloxicam ; Order Dt/Tm:   9/11/2018 2:51:01 PM          divalproex sodium  :   divalproex sodium ; Status:   Prescribed ; Ordered As Mnemonic:   Depakote  mg oral tablet, extended release ; Simple Display Line:   500 mg, 1 tab(s), PO, Daily, 90 tab(s), 1 Refill(s) ; Ordering Provider:   Rhianna Bryant MD; Catalog Code:   divalproex sodium ; Order Dt/Tm:   8/8/2018 9:29:22 AM          lidocaine topical  :   lidocaine topical ; Status:   Prescribed ; Ordered As Mnemonic:   lidocaine 5% topical ointment ; Simple Display Line:   1 jac, TOP, TID, for 7 day(s), PRN: for itching, 35 gm, 1 Refill(s) ; Ordering Provider:   Rhianna Bryant MD; Catalog Code:   lidocaine topical ; Order Dt/Tm:   8/8/2018 9:17:58 AM          triamcinolone topical  :   triamcinolone topical ; Status:   Prescribed ; Ordered As Mnemonic:   triamcinolone 0.1% topical ointment ; Simple Display Line:   1 jac, TOP, TID, for 7 day(s), PRN: for rash, 30 gm, 1 Refill(s) ; Ordering Provider:   Rhianna Bryant MD; Catalog Code:   triamcinolone topical ; Order Dt/Tm:   8/8/2018 9:17:45 AM

## 2022-02-15 NOTE — PROGRESS NOTES
Patient:   MATHEUS REARDON            MRN: [id number]            FIN: [id number]               Age:   27 years     Sex:  Female     :  1990   Associated Diagnoses:   Acute maxillary sinusitis; Tonsillitis   Author:   Claus Burns PA-C      Chief Complaint   2018 9:21 AM CST    chills, fatigue, HA, sore throat, fever, ear pain, sinus pressure. started saturday night        History of Present Illness   Chief complaint and symptoms noted above and confirmed with patient   4 day hs of sxs as above, including body aches  using theraflu, dayquil, sinus med, tylenol      Review of Systems   Constitutional:  Fever, Chills, Fatigue, body aches.    Ear/Nose/Mouth/Throat:  Sore throat, sinus pressure.    Respiratory:  No cough.    Gastrointestinal      Health Status   Allergies:    Allergic Reactions (Selected)  Severity Not Documented  Biaxin (No reactions were documented)  Keflex (No reactions were documented)   Medications:  (Selected)   Documented Medications  Documented  ibuprofen 800 mg oral tablet: 1 tab(s) ( 800 mg ), po, prn, 0 Refill(s), Type: Maintenance   Problem list:    All Problems  Bipolar / SNOMED CT 752344153 / Confirmed  Resolved: Kidney stone / SNOMED CT 430598278  Resolved: Pregnancy / SNOMED CT 951990638  Resolved: Pregnancy / SNOMED CT 109798752  Resolved: Pregnancy / SNOMED CT 930812680      Histories   Past Medical History:    Active  Bipolar (452104906)  Resolved  Kidney stone (996601372):  Resolved.  Pregnancy (047533241):  Resolved on 2007 at 17 years.  Pregnancy (115929609):  Resolved on 2009 at 18 years.   Family History:    Diabetes mellitus  Grandmother (M)  Heart disease  Grandmother (M)     Procedure history:    Tubal sterilization (240859269) on 2011 at 21 Years.  NVD (Normal Vaginal Delivery) (650) on 2011 at 21 Years.  Comments:  2011 8:19 AM - Jo Ann Floyd  Female - 39w1d.  Extraction of wisdom tooth (498210443).   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  Activity level: Moderate physical work.      Home and Environment Assessment            Marital status: Single.  Spouse/Partner name: shares custody with ex-partner.  Lives with Children,               Significant other.  3 children.  Living situation: Home/Independent.      Nutrition and Health Assessment            Type of diet: Regular.      Exercise and Physical Activity Assessment: Occasional exercise      Sexual Assessment            Sexually active: Yes.  Sexual orientation: Heterosexual.        Physical Examination   Vital Signs   2/20/2018 9:21 AM CST Temperature Tympanic 98.7 DegF    Peripheral Pulse Rate 70 bpm    Pulse Site Radial artery    HR Method Manual    Systolic Blood Pressure 102 mmHg    Diastolic Blood Pressure 64 mmHg    Mean Arterial Pressure 77 mmHg    BP Site Right arm    BP Method Manual      Measurements from flowsheet : Measurements   2/20/2018 9:21 AM CST    Weight Measured - Standard                170 lb     General:  Mild distress.    HENT:  Tympanic membranes are clear, maxillary sinus tenderness, right tonsillar pillar is moderately inflamed with patchy white exudate, left tonsil is mildly inflamed.    Neck:  Supple, moderate tenderness, moderate anterior cervical lymphadenopathy.    Respiratory:  lungs have coarse ronchi bilaterally, no wheezes, no rales.       Impression and Plan   Diagnosis     Acute maxillary sinusitis (UHW67-EI J01.00).     Tonsillitis (SHV69-QL J03.90).     Patient Instructions:  Encouraged to use heat over the sinuses, salt water nasal spray, decongestants and expectorants, NSAIDs.  Follow up if not improving.    Summary:  will treat as sinusitis and  tonsillitis with augmentin and magic moutwash, alternate between tylenol and ibuprofen , follow up if not improving.    Orders     Orders   Pharmacy:  magic mouthwash (maalox/benadryl/lidocaine. 1:1:1) (Prescribe): magic mouthwash (maalox/benadryl/lidocaine. 1:1:1), See Instructions, Instructions: 5-10 ml swish and spit before meals and qhs, Supply, # 120 mL, 1 Refill(s), Type: Maintenance, Pharmacy: [id number], 5-10 ml swish and spit before meals and qhs  Augmentin 875 mg oral tablet (Prescribe): 1 tab(s), PO, q12hr, x 10 day(s), Instructions: with food or milk, # 20 tab(s), 0 Refill(s), Type: Maintenance, Pharmacy: [id number], 1 tab(s) po q12 hrs,x10 day(s),Instr:with food or milk.     Orders   Charges (Evaluation and Management):  99231 office outpatient visit 15 minutes (Charge) (Order): Quantity: 1, Acute maxillary sinusitis  Tonsillitis.

## 2022-02-15 NOTE — PROGRESS NOTES
Subjective      pt continues to have right shoulder pain with work.  she has seen PT with her job description and is able to do the job as described.  Suspect the ongoing pain and inflammation is due to repetitive motion.  pt also has right shoulder weakness, pain is constant, worse with activity and worsened by repetitive activity, occasionally drops things, occasionally feels paresthesias   Review of Systems       no fevers, chills, sore throat, runny nose, nausea, vomiting, constipation, rash or new skin lesions, chest pain, palpitations, slurred speech, shortness of breath or wheeze.  Objective   Vitals & Measurements    T: 97.1   F (Tympanic)  HR: 72(Peripheral)  BP: 104/60  SpO2: 99%    Physical Exam       Exam:       General: alert and oriented ×3 no acute distress.       HEENT: pupils are equal round and reactive to light extraocular motion is intact. Normocephalic and atraumatic.        Hearing is grossly normal and there is no otorrhea.        Nares are patent there is no rhinorrhea.        Mucous membranes are moist and pink.       Chest: has bilateral rise with no increased work of breathing.       Cardiovascular: normal perfusion and brisk capillary refill.       Musculoskeletal: no gross focal abnormalities and normal gait.  right shoulder tender at biceps tendon and + [place] impingment and + empty can on right, normal ROM       Neuro: no gross focal abnormalities and memory seems intact.       Psychiatric: speech is clear and coherent and fluent. Patient dressed appropriately for the weather. Mood is appropriate and affect is full.                        Discussed with patient to return to clinic if symptoms worsen or do not improve   Lab Results        Lab Results (Last 4 results within 90 days)         Sodium Level: 141 mmol/L [135 mmol/L - 146 mmol/L] (09/11/18 12:53:00 CDT)        Potassium Level: 3.8 mmol/L [3.5 mmol/L - 5.3 mmol/L] (09/11/18 12:53:00 CDT)        Chloride Level: 108 mmol/L [98  mmol/L - 110 mmol/L] (09/11/18 12:53:00 CDT)        CO2 Level: 27 mmol/L [20 mmol/L - 32 mmol/L] (09/11/18 12:53:00 CDT)        Glucose Level: 71 mg/dL [65 mg/dL - 99 mg/dL] (09/11/18 12:53:00 CDT)        BUN: 12 mg/dL [7 mg/dL - 25 mg/dL] (09/11/18 12:53:00 CDT)        Creatinine Level: 0.71 mg/dL [0.5 mg/dL - 1.1 mg/dL] (09/11/18 12:53:00 CDT)        BUN/Creat Ratio: NOT APPLICABLE [6  - 22] (09/11/18 12:53:00 CDT)        eGFR: 114 mL/min/1.73m2 [8.6 mg/dL - 10.2 mg/dL] (09/11/18 12:53:00 CDT)        eGFR : 132 mL/min/1.73m2 [6  - 22] (09/11/18 12:53:00 CDT)        Calcium Level: 8.7 mg/dL [8.6 mg/dL - 10.2 mg/dL] (09/11/18 12:53:00 CDT)        Diagnostic Results    9/11/18 shoulder xr nml  Assessment/Plan       Biceps tendonitis (M75.21)         Orders:          [facility] Upper Extremity Right (Request), Priority: Routine, Subacromial impingement  Biceps tendonitis  Rotator cuff injury          Referral (Request), 11/12/18 9:37:00 CST, Referred to: Orthopaedics, Biceps tendonitis  Subacromial impingement  Rotator cuff injury                Rotator cuff injury (S46.009A)         Orders:          [facility] Upper Extremity Right (Request), Priority: Routine, Subacromial impingement  Biceps tendonitis  Rotator cuff injury          Referral (Request), 11/12/18 9:37:00 CST, Referred to: Orthopaedics, Biceps tendonitis  Subacromial impingement  Rotator cuff injury                Subacromial impingement (M75.40)         Orders:          [facility] Upper Extremity Right (Request), Priority: Routine, Subacromial impingement  Biceps tendonitis  Rotator cuff injury          Referral (Request), 11/12/18 9:37:00 CST, Referred to: Orthopaedics, Biceps tendonitis  Subacromial impingement  Rotator cuff injury               rtc 4 weeks for follow up also see workability form completed  25 minutes spent with patient in direct face to face contact, > 50% of time spent counseling and coordinating care.

## 2022-02-15 NOTE — PROGRESS NOTES
Chief Complaint    son dx strep 2 wks ago, pt rx PCN 10/22/18 for similar sx. c/o sore throat worse--more painful this morning. had tonsillectomy done 4/24/18. also had headache, sinus pressure Monday/Tuesday, productive cough, green nasal drainage.  History of Present Illness      Patient is here with a worsening sore throat.  She woke up this morning and could barely swallow.  She feels her symptoms are similar to tonsillitis that she had in April 2018.  She had a tonsillectomy 4/24/18, her son was diagnosed with strep two weeks ago.  Pt was prescribed PCN 10/22/18 for strep related symptoms.  She states that she also has headaches, sinus pressure, green nasal drainage and a productive cough.  Review of Systems          Constitutional:  No fever, Fatigue.            Eye:  Negative.            Ear/Nose/Mouth/Throat:  Nasal congestion, Sore throat.            Respiratory:  Sputum production.            Cardiovascular:  Negative.            Gastrointestinal:  Negative.            Genitourinary:  Negative.            Musculoskeletal:  Negative.            Integumentary:  Negative.            All other systems reviewed and negative              Physical Exam   Vitals & Measurements    T: 97   F (Tympanic)  HR: 68(Peripheral)  BP: 94/62     HT: 66 in  WT: 152.8 lb  BMI: 24.66       General: Alert and oriented, No acute distress.      Eye: Pupils are equal, round and reactive to light, Normal conjunctiva.      HENT: Tympanic membranes are clear, Oral mucosa is moist.  Post nasal drainage.  bilateral maxillary sinus tenderness      Throat: Tonsils--absent.  Erythematous, Exudate.      Glands: Bilateral, Submandibular gland, Enlarged.      Neck: Supple, No lymphadenopathy.      Respiratory: Lungs are clear to auscultation, Respirations are non-labored.      Cardiovascular: Normal rate, Regular rhythm.      Integumentary: Warm, No rash.      Psychiatric: Cooperative, Appropriate mood & affect, Normal judgment          Assessment/Plan       1. Strep throat (J02.0)       2. Sinusitis (J32.9)         Will switch patient from PCN to Augmentin 875mg bid x7 days.  She will also continue with Ibuprofen/Tylenol for pain/fever relief, push fluids and get plenty of rest.  She will follow up if symptoms are worsening or not improving.      ISerina MA, acted solely as a scribe for, and in the presence of Dr. Al Rivera who performed the services.             IAl MD, personally performed the services described in this documentation.  The documentation was scribed in my presence and is both accurate and complete.                Patient Information     Name:MATHEUS REARDON      Address:      [address] [place] [id number]     Sex:Female     YOB: 1990     [phone]     Emergency Contact:[facility] EMERGENCY, CONTACT     MRN:[id number]     FIN:[id number]     Location:[facility]     Date of Service:10/25/2018      Primary Care Physician:       Selam Mcghee MD, [phone]      Attending Physician:       Al Rivera MD, [phone]  Problem List/Past Medical History    Ongoing     Biceps tendonitis     Bipolar     Subacromial impingement    Historical     Kidney stone     Pregnancy     Pregnancy  Procedure/Surgical History     Tubal sterilization (12/02/2011)           NVD (Normal Vaginal Delivery) (08/25/2011)            Comments:      Female - 39w1d.     Extraction of wisdom tooth        Medications     triamcinolone 0.1% topical ointment: 1 jac, TOP, TID, for 7 day(s), PRN: for rash, 30 gm, 1 Refill(s).     lidocaine 5% topical ointment: 1 jac, TOP, TID, for 7 day(s), PRN: for itching, 35 gm, 1 Refill(s).     Depakote  mg oral tablet, extended release: 500 mg, 1 tab(s), PO, Daily, 90 tab(s), 1 Refill(s).     meloxicam 15 mg oral tablet: 15 mg, 1 tab(s), PO, Daily, PRN: for pain, 30 tab(s), 3 Refill(s).     DULoxetine 60 mg oral  delayed release capsule: 60 mg, 1 cap(s), Oral, daily, 90 cap(s), 1 Refill(s).     Augmentin 875 mg oral tablet: 1 tab(s), PO, bid, for 7 day(s), 14 tab(s), 0 Refill(s).          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 Activity level: Moderate physical work., 12/19/2017     Home and Environment      Marital status: Single. Spouse/Partner name: shares custody with ex-partner. Lives with Children, Significant other. 3 children. Living situation: Home/Independent., 12/19/2017     Nutrition and Health      Type of diet: Regular., 05/27/2015     Sexual      Sexually active: Yes. Sexual orientation: Heterosexual., 05/27/2015     Tobacco      Former smoker, quit more than 30 days ago, 02/26/2018  Family History    Diabetes mellitus: Grandmother (M).    Heart disease: Grandmother (M).  Immunizations      Vaccine Date Status Comments      tetanus/diphth/pertuss (Tdap) adult/adol 08/26/2011 Recorded [facility]      influenza virus vaccine, inactivated 09/17/2009 Recorded      Td 07/26/2008 Recorded      rubella 09/13/2007 Recorded      Td 06/10/2004 Recorded      Hep B 11/10/1999 Recorded      Hep B 10/21/1998 Recorded      Hep B 06/17/1998 Recorded      Hep B 05/08/1998 Recorded      varicella 05/08/1998 Recorded      MMR (measles/mumps/rubella) 08/19/1993 Recorded      OPV 08/19/1993 Recorded      DTaP 08/19/1993 Recorded      Hib 04/01/1993 Recorded      MMR (measles/mumps/rubella) 04/23/1991 Recorded      OPV 04/23/1991 Recorded      DTaP 04/23/1991 Recorded      OPV 03/01/1989 Recorded      DTaP 03/01/1989 Recorded      OPV 1988 Recorded      DTaP 1988 Recorded      OPV 1988 Recorded      DTaP 1988 Recorded

## 2022-02-15 NOTE — TELEPHONE ENCOUNTER
---------------------  From: Nusrat Crum   To: MATHEUS REARDON    Sent: 3/3/2021 10:15:54 AM CST  Subject: General Message     Dr. Bryant would like to see you for a follow up appointment. If you would like to schedule, please call us at [phone]. Thank you!

## 2022-06-16 ENCOUNTER — VIRTUAL VISIT (OUTPATIENT)
Dept: FAMILY MEDICINE | Facility: CLINIC | Age: 32
End: 2022-06-16
Payer: COMMERCIAL

## 2022-06-16 DIAGNOSIS — F31.4 BIPOLAR DISORDER, CURRENT EPISODE DEPRESSED, SEVERE, WITHOUT PSYCHOTIC FEATURES (H): Primary | ICD-10-CM

## 2022-06-16 DIAGNOSIS — F31.10 BIPOLAR I DISORDER, MOST RECENT EPISODE (OR CURRENT) MANIC (H): ICD-10-CM

## 2022-06-16 PROCEDURE — 96127 BRIEF EMOTIONAL/BEHAV ASSMT: CPT | Mod: GT | Performed by: FAMILY MEDICINE

## 2022-06-16 PROCEDURE — 99214 OFFICE O/P EST MOD 30 MIN: CPT | Mod: GT | Performed by: FAMILY MEDICINE

## 2022-06-16 RX ORDER — DIVALPROEX SODIUM 500 MG/1
500 TABLET, EXTENDED RELEASE ORAL DAILY
Qty: 30 TABLET | Refills: 1 | Status: SHIPPED | OUTPATIENT
Start: 2022-06-16 | End: 2022-06-20

## 2022-06-16 RX ORDER — DULOXETIN HYDROCHLORIDE 30 MG/1
CAPSULE, DELAYED RELEASE ORAL
Qty: 49 CAPSULE | Refills: 1 | Status: SHIPPED | OUTPATIENT
Start: 2022-06-16 | End: 2022-06-20

## 2022-06-16 RX ORDER — QUETIAPINE FUMARATE 50 MG/1
TABLET, FILM COATED ORAL
Qty: 228 TABLET | Refills: 1 | Status: SHIPPED | OUTPATIENT
Start: 2022-06-16 | End: 2022-06-20

## 2022-06-16 ASSESSMENT — PATIENT HEALTH QUESTIONNAIRE - PHQ9
5. POOR APPETITE OR OVEREATING: NEARLY EVERY DAY
SUM OF ALL RESPONSES TO PHQ QUESTIONS 1-9: 25

## 2022-06-16 ASSESSMENT — ANXIETY QUESTIONNAIRES
2. NOT BEING ABLE TO STOP OR CONTROL WORRYING: NEARLY EVERY DAY
IF YOU CHECKED OFF ANY PROBLEMS ON THIS QUESTIONNAIRE, HOW DIFFICULT HAVE THESE PROBLEMS MADE IT FOR YOU TO DO YOUR WORK, TAKE CARE OF THINGS AT HOME, OR GET ALONG WITH OTHER PEOPLE: EXTREMELY DIFFICULT
7. FEELING AFRAID AS IF SOMETHING AWFUL MIGHT HAPPEN: NEARLY EVERY DAY
3. WORRYING TOO MUCH ABOUT DIFFERENT THINGS: NEARLY EVERY DAY
6. BECOMING EASILY ANNOYED OR IRRITABLE: NEARLY EVERY DAY
GAD7 TOTAL SCORE: 21
1. FEELING NERVOUS, ANXIOUS, OR ON EDGE: NEARLY EVERY DAY
5. BEING SO RESTLESS THAT IT IS HARD TO SIT STILL: NEARLY EVERY DAY
GAD7 TOTAL SCORE: 21

## 2022-06-16 NOTE — PROGRESS NOTES
Chief Complaint   Patient presents with     Depression     Pt c/o depression/anxiety and bipolar disorder. She states she has been off meds for over a year and feels she is spiraling again. PHQ9 score 25, MARY 7 score 21.       Orly is a 31 year old who is being evaluated via a billable video visit.      How would you like to obtain your AVS? MyChart  If the video visit is dropped, the invitation should be resent by: Text to cell phone: [phone]  Will anyone else be joining your video visit? No      Start 1603  Stop 1611    Assessment & Plan   Problem List Items Addressed This Visit    None     Visit Diagnoses     Bipolar disorder, current episode depressed, severe, without psychotic features (H)    -  Primary    Relevant Medications    divalproex sodium extended-release (DEPAKOTE ER) 500 MG 24 hr tablet    DULoxetine (CYMBALTA) 30 MG capsule    QUEtiapine (SEROQUEL) 50 MG tablet    Bipolar I disorder, most recent episode (or current) manic (H)        Relevant Medications    DULoxetine (CYMBALTA) 30 MG capsule      We are going to resume the duloxetine at 30 mg daily for a week and then bump it up to 60 mg.  We will resume the Depakote at her prior therapeutic dose of 500 mg daily.  She does have her tubes tied so very low risk of pregnancy.  Previously she had been on quetiapine at 25 mg at bedtime.  She tells me that she feels manic however she certainly has elements of depression at this time also.  We are going to let her have the 50 mg quetiapine and she can taper it up as needed as directed on the packaging however she is feeling like her symptoms are well controlled she does not have to continue to taper up.  We are going to get together in person next week to complete some FMLA/disability paperwork.    Discussed the case with our MTM pharmacist for changes and quetiapine doses based on patient's current mixed active episode of her bipolar disorder.    She is able to contract for safety and against  suicide with me today.  We have a safety plan.  She has a supportive significant other and is a mom and tells me she would not commit suicide because she needs to be there to protect her daughter.             Tobacco Cessation:   reports that she has been smoking cigarettes. She has smoked for the past 3.00 years. She does not have any smokeless tobacco history on file.  Tobacco Cessation Action Plan: We will work on this as soon as we get patient's mood under better control    Depression Screening Follow Up    PHQ 6/16/2022   PHQ-9 Total Score 25   Q9: Thoughts of better off dead/self-harm past 2 weeks Several days     Last PHQ-9 6/16/2022   1.  Little interest or pleasure in doing things 3   2.  Feeling down, depressed, or hopeless 3   3.  Trouble falling or staying asleep, or sleeping too much 3   4.  Feeling tired or having little energy 3   5.  Poor appetite or overeating 3   6.  Feeling bad about yourself 3   7.  Trouble concentrating 3   8.  Moving slowly or restless 3   Q9: Thoughts of better off dead/self-harm past 2 weeks 1   PHQ-9 Total Score 25   Difficulty at work, home, or with people Extremely dIfficult           Follow Up      Follow Up Actions Taken  Crisis resource information provided in the After Visit Summary    Discussed the following ways the patient can remain in a safe environment:  be around others      Return in about 1 week (around 6/23/2022) for 40 min please, follow up mood and fill out paperwork.    Rhianna Bryant MD  [facility]    Fernando Villalba is a 31 year old, presenting for the following health issues:  Depression (Pt c/o depression/anxiety and bipolar disorder. She states she has been off meds for over a year and feels she is spiraling again. PHQ9 score 25, MARY 7 score 21.)      HPI   Patient has bipolar disorder past that she was well controlled on her previous medications.  At that time she was able to tell me that staying on her  medications is Her from having severe symptoms.  Unfortunately about a year ago she forgot to get her medications refilled and was doing okay off of them but just decided not to get it renewed.  Now for the past 2 to 3 months for depression symptoms have been worse and she is feeling manic right now.  Not sleeping well.  She does have thoughts of suicide but has no intention of suicide.  She plans to get a bipolar disorder for better control and to continue to be there to take care of her daughter.  She has support from her boyfriend.        Review of Systems neg except as per HPI        Objective           Vitals:  No vitals were obtained today due to virtual visit.    Physical Exam   GENERAL: Healthy, alert and no distress  EYES: Eyes grossly normal to inspection.  No discharge or erythema, or obvious scleral/conjunctival abnormalities.  RESP: No audible wheeze, cough, or visible cyanosis.  No visible retractions or increased work of breathing.    SKIN: Visible skin clear. No significant rash, abnormal pigmentation or lesions.  NEURO: Cranial nerves grossly intact.  Mentation and speech appropriate for age.  PSYCH:mood sad and anxious, affect congruent judgement and insight intact, normal speech and appearance well-groomed.                Video-Visit Details    Type of service:  Video Visit      Originating Location (pt. Location): Home    Distant Location (provider location):  [facility]     Platform used for Video Visit: Daxa    .  ..

## 2022-06-16 NOTE — COMMUNITY RESOURCES LIST (ENGLISH)
06/16/2022   [facility]  Helen Jacobs  For questions about this resource list or additional care needs, please contact your primary care clinic or care manager.  Phone: [phone]   Email: N/A   Address: [address] [place] [id number]   Hours: N/A        Hotlines and Helplines       Hotline - Crisis help  1  [facility] - Behavioral Health Services - Access Line Distance: 18.2 miles      COVID-19 Status: Phone/Virtual   [address] [place] [address]  Language: English, Norwegian  Hours: Mon - Fri 8:00 AM - 4:30 PM   Phone: (970) 421-7623 Website: https://www.[facility].gov/250/Behavioral-Health-Services     2  [facility] - Crisis Response of [facility] Distance: 19.12 miles      COVID-19 Status: Phone/Virtual   [address] [place] [address]  Language: English  Hours: Mon - Thu 8:00 AM - 5:00 PM , Fri 8:00 AM - 12:00 PM   Phone: (680) 520-1644 Email: info@[facility].org Website: http://www.[facility].org          Mental Health       Individual counseling  3  [facility] Distance: 1.4 miles      COVID-19 Status: Regular Operations, COVID-19 Status: Phone/Virtual   [address] [place] [address]  Language: English  Hours: Mon - Fri 1:00 PM - 7:00 PM  Fees: Insurance, Self Pay, Sliding Fee   Phone: (326) 754-4334 Email: [employer] Website: http://www.[employer]     4  [facility] Distance: 1.41 miles      COVID-19 Status: Regular Operations   [address] [place] [address]  Language: English  Hours: Mon - Fri Appt. Only  Fees: Insurance, Self Pay   Phone: (293) 592-7581 Website: [email or web address]/locations/-[facility]     Mental health crisis care  5  [facility] Distance: 19.12 miles      COVID-19 Status:  Regular Operations, COVID-19 Status: Phone/Virtual   [address] [place] [address]  Language: English  Hours: Mon - Thu 8:00 AM - 5:00 PM , Fri 8:00 AM - 12:00 PM  Fees: Insurance, Self Pay   Phone: (631) 789-4855 Email: info@[facility].org Website: http://www.[facility].org     6  [facility] - Crisis Stabilization Services ([facility]) Distance: 21.34 miles      COVID-19 Status: Regular Operations   [address] [place] [address]  Language: English, Yemeni  Hours: Mon - Sun Open 24 Hours  Fees: Insurance   Phone: (159) 747-3211 Email: info@[employer] Website: https://[employer].org/services-programs/residential-services/julius-guest-house/     Mental health support group  7  [facility] Distance: 17.38 miles      COVID-19 Status: Service Unavailable   [address] [place] [address]  Language: English  Hours: Wed 1:00 PM - 3:00 PM  Fees: Insurance, Self Pay   Phone: (371) 995-1673 Email: familyUniweb.ru@[employer] Website: https://www.[employer]/     8  [facility] Distance: 18.73 miles      COVID-19 Status: Phone/Virtual   [address] [facility] [address] [place] [address]  Language: English  Hours: Mon - Thu 7:00 AM - 8:00 PM , Fri 7:00 AM - 5:00 PM  Fees: Insurance, Self Pay   Phone: (126) 602-2015 Email: ana laura@[employer] Website: https://www.[employer].edu/our-locations/[place]/[facility]/          Important Numbers & Websites       Emergency Services   911  City Services   311  Poison Control   (514) 413-2095  Suicide Prevention Lifeline   (116) 978-3763 (TALK)  Child Abuse Hotline   (111) 478-1728 (4-A-Child)  Sexual Assault Hotline   (811) 478-8218 (HOPE)  National Runaway Safeline   (254) 961-6970 (RUNAWAY)  All-Options Talkline   (658) 941-8951  Substance Abuse Referral   (414) 337-8972 (HELP)

## 2022-06-20 ENCOUNTER — OFFICE VISIT (OUTPATIENT)
Dept: FAMILY MEDICINE | Facility: CLINIC | Age: 32
End: 2022-06-20
Payer: COMMERCIAL

## 2022-06-20 VITALS
WEIGHT: 161 LBS | DIASTOLIC BLOOD PRESSURE: 60 MMHG | SYSTOLIC BLOOD PRESSURE: 100 MMHG | OXYGEN SATURATION: 100 % | HEART RATE: 89 BPM | BODY MASS INDEX: 25.99 KG/M2

## 2022-06-20 DIAGNOSIS — R45.851 SUICIDAL IDEATION: ICD-10-CM

## 2022-06-20 DIAGNOSIS — F31.4 BIPOLAR DISORDER, CURRENT EPISODE DEPRESSED, SEVERE, WITHOUT PSYCHOTIC FEATURES (H): Primary | ICD-10-CM

## 2022-06-20 PROCEDURE — 99214 OFFICE O/P EST MOD 30 MIN: CPT | Performed by: FAMILY MEDICINE

## 2022-06-20 RX ORDER — DULOXETIN HYDROCHLORIDE 30 MG/1
CAPSULE, DELAYED RELEASE ORAL
Qty: 49 CAPSULE | Refills: 1 | Status: SHIPPED | OUTPATIENT
Start: 2022-06-20 | End: 2022-07-18

## 2022-06-20 RX ORDER — QUETIAPINE FUMARATE 50 MG/1
TABLET, FILM COATED ORAL
Qty: 228 TABLET | Refills: 1 | Status: SHIPPED | OUTPATIENT
Start: 2022-06-20 | End: 2022-07-20

## 2022-06-20 RX ORDER — DIVALPROEX SODIUM 500 MG/1
500 TABLET, EXTENDED RELEASE ORAL DAILY
Qty: 30 TABLET | Refills: 1 | Status: SHIPPED | OUTPATIENT
Start: 2022-06-20

## 2022-06-20 ASSESSMENT — ANXIETY QUESTIONNAIRES
6. BECOMING EASILY ANNOYED OR IRRITABLE: NEARLY EVERY DAY
5. BEING SO RESTLESS THAT IT IS HARD TO SIT STILL: NEARLY EVERY DAY
GAD7 TOTAL SCORE: 21
4. TROUBLE RELAXING: NEARLY EVERY DAY
7. FEELING AFRAID AS IF SOMETHING AWFUL MIGHT HAPPEN: NEARLY EVERY DAY
GAD7 TOTAL SCORE: 21
GAD7 TOTAL SCORE: 21
8. IF YOU CHECKED OFF ANY PROBLEMS, HOW DIFFICULT HAVE THESE MADE IT FOR YOU TO DO YOUR WORK, TAKE CARE OF THINGS AT HOME, OR GET ALONG WITH OTHER PEOPLE?: EXTREMELY DIFFICULT
7. FEELING AFRAID AS IF SOMETHING AWFUL MIGHT HAPPEN: NEARLY EVERY DAY
3. WORRYING TOO MUCH ABOUT DIFFERENT THINGS: NEARLY EVERY DAY
1. FEELING NERVOUS, ANXIOUS, OR ON EDGE: NEARLY EVERY DAY
2. NOT BEING ABLE TO STOP OR CONTROL WORRYING: NEARLY EVERY DAY

## 2022-06-20 ASSESSMENT — PATIENT HEALTH QUESTIONNAIRE - PHQ9
SUM OF ALL RESPONSES TO PHQ QUESTIONS 1-9: 27
SUM OF ALL RESPONSES TO PHQ QUESTIONS 1-9: 27
10. IF YOU CHECKED OFF ANY PROBLEMS, HOW DIFFICULT HAVE THESE PROBLEMS MADE IT FOR YOU TO DO YOUR WORK, TAKE CARE OF THINGS AT HOME, OR GET ALONG WITH OTHER PEOPLE: EXTREMELY DIFFICULT

## 2022-06-20 NOTE — LETTER
June 20, 2022      Orly Yates  [address] [place] [id number]        To Whom It May Concern:    Orly Yates  was seen in clinic on 6/20/2022.  Please excuse her from work 6/17/22 through 6/30/2022 and return to work 7/1/2022.        Sincerely,        Rhianna Bryant MD

## 2022-06-20 NOTE — PROGRESS NOTES
Assessment & Plan   Problem List Items Addressed This Visit    None     Visit Diagnoses     Bipolar disorder, current episode depressed, severe, without psychotic features (H)    -  Primary    Relevant Medications    DULoxetine (CYMBALTA) 30 MG capsule    QUEtiapine (SEROQUEL) 50 MG tablet    divalproex sodium extended-release (DEPAKOTE ER) 500 MG 24 hr tablet    Suicidal ideation             Will have patient start her Depakote, duloxetine, and quetiapine.  Printed out a copy of the 3 prescriptions for them to take to family fresh in [facility] to see how much it would cost their out-of-pocket as I am getting a notification that we need to do a prior authorization for it.  We will get a follow-up in 2 weeks, sooner if needed.  She is able to contract for safety and against suicide.  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  She was given a letter today to excuse her from work from June 17 to June 30.  If she is not feeling ready to go back to work she can come in and see me on July 1.           Depression Screening Follow Up    PHQ 6/20/2022   PHQ-9 Total Score 27   Q9: Thoughts of better off dead/self-harm past 2 weeks Nearly every day   F/U: Thoughts of suicide or self-harm Yes   F/U: Self harm-plan No   F/U: Self-harm action No   F/U: Safety concerns No     Last PHQ-9 6/20/2022   1.  Little interest or pleasure in doing things 3   2.  Feeling down, depressed, or hopeless 3   3.  Trouble falling or staying asleep, or sleeping too much 3   4.  Feeling tired or having little energy 3   5.  Poor appetite or overeating 3   6.  Feeling bad about yourself 3   7.  Trouble concentrating 3   8.  Moving slowly or restless 3   Q9: Thoughts of better off dead/self-harm past 2 weeks 3   PHQ-9 Total Score 27   Difficulty at work, home, or with people -   In the past two weeks have you had  thoughts of suicide or self harm? Yes   Do you have concerns about your personal safety or the safety of others? No   In the past 2 weeks have you thought about a plan or had intention to harm yourself? No   In the past 2 weeks have you acted on these thoughts in any way? No               Follow Up      Follow Up Actions Taken if her employer needs us to complete some paperwork we can get back together to do that together.  Crisis resource information provided in the After Visit Summary    Discussed the following ways the patient can remain in a safe environment:  be around others      Return in about 2 weeks (around 7/4/2022).    Rhianna Bryant MD  [facility]    Fernando Villalba is a 31 year old, presenting for the following health issues:  Short Term Disability  (Discuss Short Term Disability) and Recheck Medication (Migraines, also she has not started duloxetine, cymbalta or quetiapine)      HPI Answers for HPI/ROS submitted by the patient on 6/20/2022  If you checked off any problems, how difficult have these problems made it for you to do your work, take care of things at home, or get along with other people?: Extremely difficult  PHQ9 TOTAL SCORE: 27  MARY 7 TOTAL SCORE: 21  No audio or visual hallucinations.  She was not able to  her prescriptions.  Apparently 1 of has already been resent for patient.  She feels like she needs some time off from work while waiting for her medications to be restarted.  She is having suicidal thoughts but no plan and does not know that she feels better when she takes her medications.            Review of Systems   Negative except as per HPI.      Objective    /60 (BP Location: Right arm, Patient Position: Sitting)   Pulse 89   Wt 73 kg (161 lb)   SpO2 100%   BMI 25.99 kg/m    Body mass index is 25.99 kg/m .  Physical Exam                       .  ..

## 2022-06-30 ENCOUNTER — TELEPHONE (OUTPATIENT)
Dept: FAMILY MEDICINE | Facility: CLINIC | Age: 32
End: 2022-06-30

## 2022-06-30 NOTE — TELEPHONE ENCOUNTER
Please call pt to schedule an appt with SHRAVAN to discuss/complete her FMLA paperwork. SHRAVAN is OOC for next 2 weeks. Thank you!

## 2022-07-24 ENCOUNTER — HEALTH MAINTENANCE LETTER (OUTPATIENT)
Age: 32
End: 2022-07-24

## 2022-10-03 ENCOUNTER — HEALTH MAINTENANCE LETTER (OUTPATIENT)
Age: 32
End: 2022-10-03

## 2023-08-13 ENCOUNTER — HEALTH MAINTENANCE LETTER (OUTPATIENT)
Age: 33
End: 2023-08-13

## 2024-10-05 ENCOUNTER — HEALTH MAINTENANCE LETTER (OUTPATIENT)
Age: 34
End: 2024-10-05